# Patient Record
Sex: MALE | Race: WHITE | NOT HISPANIC OR LATINO
[De-identification: names, ages, dates, MRNs, and addresses within clinical notes are randomized per-mention and may not be internally consistent; named-entity substitution may affect disease eponyms.]

---

## 2019-03-04 PROBLEM — Z00.00 ENCOUNTER FOR PREVENTIVE HEALTH EXAMINATION: Status: ACTIVE | Noted: 2019-03-04

## 2019-04-29 ENCOUNTER — APPOINTMENT (OUTPATIENT)
Dept: ORTHOPEDIC SURGERY | Facility: CLINIC | Age: 79
End: 2019-04-29
Payer: MEDICARE

## 2019-04-29 DIAGNOSIS — Z87.891 PERSONAL HISTORY OF NICOTINE DEPENDENCE: ICD-10-CM

## 2019-04-29 DIAGNOSIS — Z85.46 PERSONAL HISTORY OF MALIGNANT NEOPLASM OF PROSTATE: ICD-10-CM

## 2019-04-29 DIAGNOSIS — Z83.3 FAMILY HISTORY OF DIABETES MELLITUS: ICD-10-CM

## 2019-04-29 DIAGNOSIS — Z87.898 PERSONAL HISTORY OF OTHER SPECIFIED CONDITIONS: ICD-10-CM

## 2019-04-29 PROCEDURE — 73564 X-RAY EXAM KNEE 4 OR MORE: CPT | Mod: 50

## 2019-04-29 PROCEDURE — 99213 OFFICE O/P EST LOW 20 MIN: CPT

## 2019-04-29 RX ORDER — ASPIRIN 81 MG
81 TABLET, DELAYED RELEASE (ENTERIC COATED) ORAL
Refills: 0 | Status: ACTIVE | COMMUNITY

## 2019-04-29 RX ORDER — IBUPROFEN 800 MG
800 TABLET ORAL
Refills: 0 | Status: ACTIVE | COMMUNITY

## 2019-04-29 NOTE — REVIEW OF SYSTEMS
[Joint Pain] : joint pain [Joint Stiffness] : joint stiffness [Negative] : Heme/Lymph [Joint Swelling] : no joint swelling [FreeTextEntry9] : Left knee

## 2019-04-29 NOTE — HISTORY OF PRESENT ILLNESS
[de-identified] : 79 year old male s/p right total knee replacement presents to the office today for a follow up. Pt reports doing very well in regard to his right hip. He reports increased functionality and mobility. However, patient reports pain in his arthritic left knee that we have been following him for. Pt was last seen by us in November 2018 and received a cortisone injection with much relief.  Pt reports just having activity related pain. He reports taking Motrin for pain with good relief. Patient is back today looking to receive another injection.

## 2019-04-29 NOTE — PHYSICAL EXAM
[2+] : left 2+ [Normal] : Alert and in no acute distress [de-identified] : Left Knee\par Inspection: no effusion or erythema.\par Wounds: none.\par Alignment: normal.\par Palpation: no specific tenderness on palpation.\par ROM: Full ROM with flexion and extension. \par Muscle Test: good quad strength.\par Leg examination: calf is soft and non-tender.\par \par Right Knee\par Inspection: no effusion\par Wounds: healed midline incision\par Alignment: normal.\par Palpation: no specific tenderness on palpation.\par ROM: Full ROM with flexion and extension. \par Muscle Test: good quad strength.\par Leg examination: calf is soft and non-tender.\par  [de-identified] : Sensation grossly intact about bilateral lower extremities.\par  [de-identified] : Radiographs on April 29, 2019 AP standing both knees lateral and skyline both knees demonstrate the bony structures are intact no fractures or dislocations there is a well aligned cemented posterior stabilized knee replacement on the right advanced medial compartment arthritis on the left with a significantly narrowed but not yet bone-on-bone medial compartment impression stable right knee prosthesis and medial compartment arthritis left knee

## 2019-04-29 NOTE — ASSESSMENT
[FreeTextEntry1] : Patient comes in today status post right total knee replacement in June of 2015 the right knee is doing great no complaints his only complaint is recurrence of the pain in the left knee he did receive a steroid injection in that knee last year we'll which lasted a few months he is also had success with the gel injections and is interested in the doing them again for the left knee clinically the right knee has a well-healed midline incision full extension with flexion beyond 100° with good stability the left knee and has both medial lateral joint line tenderness and pain and crepitus on range of motion radiographs please refer to imaging impression status post successful right total knee replacement Osteoarthritis of the left knee plan gel injections for the left knee

## 2019-07-01 ENCOUNTER — APPOINTMENT (OUTPATIENT)
Dept: ORTHOPEDIC SURGERY | Facility: CLINIC | Age: 79
End: 2019-07-01
Payer: MEDICARE

## 2019-07-01 RX ORDER — HYALURONATE SOD, CROSS-LINKED 30 MG/3 ML
30 SYRINGE (ML) INTRAARTICULAR
Refills: 0 | Status: COMPLETED | OUTPATIENT
Start: 2019-07-01

## 2019-07-01 RX ORDER — MULTIVITAMIN
TABLET ORAL
Refills: 0 | Status: DISCONTINUED | COMMUNITY
End: 2019-07-01

## 2019-07-01 RX ADMIN — Medication 0 MG/3ML: at 00:00

## 2019-07-01 NOTE — PHYSICAL EXAM
[de-identified] : Discussed at length with the patient the planned Gel-One injection. The risks, benefits, convalescence and alternatives were reviewed. The possible side effects discussed included but were not limited to: pain, swelling, heat and redness. There symptoms are generally mild but if they are extensive then contact the office. Giving pain relievers by mouth such as NSAID’s or Tylenol can generally treat the reactions to steroid and lidocaine. Rare cases of infection have been noted. Rash, hives and itching may occur post injection. If you have muscle pain or cramps, flushing and or swelling of the face, rapid heart beat, nausea, dizziness, fever, chills, headache, difficulty breathing, swelling in the arms or legs, or have a prickly feeling of your skin, contact a health care provider immediately.\par \par Following this discussion, the knee was prepped with Betadine and under sterile conditions the Gel-One injection was performed with a 22 gauge needle. The needle was introduced into the joint, aspiration was performed to ensure intra-articular placement and the medication was injected. Upon withdrawal of the needle the site was cleaned with alcohol and a Band-Aid applied. The patient tolerated the injection well and there were no adverse effects. Post injection instructions included no strenuous activity for 24 hours, cryotherapy and if there are any adverse effects to contact the office.

## 2020-01-06 ENCOUNTER — APPOINTMENT (OUTPATIENT)
Dept: ORTHOPEDIC SURGERY | Facility: CLINIC | Age: 80
End: 2020-01-06
Payer: MEDICARE

## 2020-01-06 PROCEDURE — 99214 OFFICE O/P EST MOD 30 MIN: CPT

## 2020-01-06 NOTE — PHYSICAL EXAM
[de-identified] : Left Knee-\par Varus deformity\par medial joint line tenderness \par ROM: 0-100 degrees  [de-identified] : No imaging performed today

## 2020-01-06 NOTE — ASSESSMENT
[FreeTextEntry1] : Pt is here to f/u after gel one injection into the left knee in July which gave him good relief. The pain is starting to return. He has documented end stage arthritis. He is happy at this time , treating it conservatively, we will re order the gel injections for the left knee and start them once we have the product.

## 2020-01-06 NOTE — HISTORY OF PRESENT ILLNESS
[de-identified] : 79 year old male s/p right total knee replacement presents to the office today for a follow up on his arthritic left knee. Pt received a Gel one injection in his left knee in July 2019 with some relief. Pt has been taking Motrin for pain with only some relief. Pt is here today to discuss his options for another gel injection in his arthritic left knee.

## 2020-06-11 ENCOUNTER — APPOINTMENT (OUTPATIENT)
Dept: ORTHOPEDIC SURGERY | Facility: CLINIC | Age: 80
End: 2020-06-11
Payer: MEDICARE

## 2020-06-11 VITALS — TEMPERATURE: 98.1 F

## 2020-06-11 RX ORDER — HYALURONATE SODIUM 20 MG/2 ML
20 SYRINGE (ML) INTRAARTICULAR
Refills: 0 | Status: COMPLETED | OUTPATIENT
Start: 2020-06-11

## 2020-06-11 RX ADMIN — Medication 2 MG/2ML: at 00:00

## 2020-06-11 NOTE — ASSESSMENT
[FreeTextEntry1] : Discussed at length with the patient the planned Euflexxa injection. The risks, benefits, convalescence and alternatives were reviewed. The possible side effects discussed included but were not limited to: pain, swelling, heat and redness. There symptoms are generally mild but if they are extensive then contact the office. Giving pain relievers by mouth such as NSAID’s or Tylenol can generally treat the reactions to steroid and lidocaine. Rare cases of infection have been noted. Rash, hives and itching may occur post injection. If you have muscle pain or cramps, flushing and or swelling of the face, rapid heart beat, nausea, dizziness, fever, chills, headache, difficulty breathing, swelling in the arms or legs, or have a prickly feeling of your skin, contact a health care provider immediately.\par \par Following this discussion, the left knee was prepped with betadine and under sterile conditions the Euflexxa injection was performed with a 22 gauge needle. The needle was introduced into the joint, aspiration was performed to ensure intra-articular placement and the medication was injected. Upon withdrawal of the needle the site was cleaned with alcohol and a bandaid applied. The patient tolerated the injection well and there were no adverse effects. Post injection instructions included no strenuous activity for 24 hours, cryotherapy and if there are any adverse effects to contact the office.

## 2020-06-19 ENCOUNTER — APPOINTMENT (OUTPATIENT)
Dept: ORTHOPEDIC SURGERY | Facility: CLINIC | Age: 80
End: 2020-06-19
Payer: MEDICARE

## 2020-06-19 PROCEDURE — 99212 OFFICE O/P EST SF 10 MIN: CPT | Mod: 25

## 2020-06-19 RX ORDER — HYALURONATE SODIUM 20 MG/2 ML
20 SYRINGE (ML) INTRAARTICULAR
Refills: 0 | Status: COMPLETED | OUTPATIENT
Start: 2020-06-19

## 2020-06-19 RX ADMIN — Medication 2 MG/2ML: at 00:00

## 2020-06-19 NOTE — HISTORY OF PRESENT ILLNESS
[de-identified] : 80 year old male presents to the office for his second Euflexxa injection in his left arthritic knee.

## 2020-06-26 ENCOUNTER — APPOINTMENT (OUTPATIENT)
Dept: ORTHOPEDIC SURGERY | Facility: CLINIC | Age: 80
End: 2020-06-26
Payer: MEDICARE

## 2020-06-26 VITALS — TEMPERATURE: 97.2 F

## 2020-06-26 PROCEDURE — 99213 OFFICE O/P EST LOW 20 MIN: CPT | Mod: 25

## 2020-06-26 RX ORDER — HYALURONATE SODIUM 20 MG/2 ML
20 SYRINGE (ML) INTRAARTICULAR
Refills: 0 | Status: COMPLETED | OUTPATIENT
Start: 2020-06-26

## 2020-06-26 RX ADMIN — Medication 2 MG/2ML: at 00:00

## 2020-06-26 NOTE — HISTORY OF PRESENT ILLNESS
[de-identified] : 80 year old male presents to the office today for a Euflexxa injection in his left arthritic knee.

## 2020-09-02 ENCOUNTER — APPOINTMENT (OUTPATIENT)
Dept: UROLOGY | Facility: CLINIC | Age: 80
End: 2020-09-02
Payer: MEDICARE

## 2020-09-02 VITALS — TEMPERATURE: 98.2 F | WEIGHT: 162 LBS | HEIGHT: 70 IN | BODY MASS INDEX: 23.19 KG/M2

## 2020-09-02 DIAGNOSIS — R31.29 OTHER MICROSCOPIC HEMATURIA: ICD-10-CM

## 2020-09-02 PROCEDURE — 99214 OFFICE O/P EST MOD 30 MIN: CPT

## 2020-09-02 RX ORDER — SIMVASTATIN 40 MG/1
TABLET, FILM COATED ORAL
Refills: 0 | Status: ACTIVE | COMMUNITY

## 2020-09-02 NOTE — ASSESSMENT
[FreeTextEntry1] : 80 year old male with a history of prostate cancer diagnosed in 2002 and had a prostatectomy. Patient said his PSA has been undetectable but no recent PSA.He had an IPP in 2003 and had it replaced again approx 10 years later. He states that he noticed 2 months ago that he had a UTI and had microscopic hematuria. He states since he has had penile pressure and inflammation- on my exam has impending erosion on the left side.  Also the pump was hard and difficult to work. He has not seen Dr. Schmidt in over 3 years and has an appointment at the end of the month. Voiding with a good flow. Denies gross hematuria, dysuria, flank pain, fever, or chills. Non smoker. \par \par

## 2020-09-02 NOTE — HISTORY OF PRESENT ILLNESS
[Currently Experiencing ___] :  [unfilled] [Post-Void Dribbling] : post-void dribbling [Erectile Dysfunction] : Erectile Dysfunction [None] : None [FreeTextEntry1] : 80 year old male with a history of prostate cancer diagnosed in 2002 and had a prostatectomy. Patient said his PSA has been undetectable but no recent PSA.He had an IPP in 2003 and had it replaced again approx 10 years later. He states that he noticed 2 months ago that he had a UTI and had microscopic hematuria. He states since he has had penile pressure and inflammation- on my exam has impending erosion on the left side.  Also the pump was hard and difficult to work. He has not seen Dr. Schmidt in over 3 years and has an appointment at the end of the month. Voiding with a good flow. Denies gross hematuria, dysuria, flank pain, fever, or chills. Non smoker. \par \par  [Urinary Incontinence] : no urinary incontinence [Urinary Urgency] : no urinary urgency [Urinary Retention] : no urinary retention [Urinary Frequency] : no urinary frequency [Straining] : no straining [Nocturia] : no nocturia [Hematuria - Gross] : no gross hematuria [Weak Stream] : no weak stream [Intermittency] : no intermittency

## 2020-09-02 NOTE — PHYSICAL EXAM
[Normal Appearance] : normal appearance [General Appearance - Well Nourished] : well nourished [General Appearance - Well Developed] : well developed [Edema] : no peripheral edema [Well Groomed] : well groomed [General Appearance - In No Acute Distress] : no acute distress [Respiration, Rhythm And Depth] : normal respiratory rhythm and effort [Abdomen Soft] : soft [Exaggerated Use Of Accessory Muscles For Inspiration] : no accessory muscle use [Abdomen Tenderness] : non-tender [Costovertebral Angle Tenderness] : no ~M costovertebral angle tenderness [Normal Station and Gait] : the gait and station were normal for the patient's age [No Focal Deficits] : no focal deficits [] : no rash [Oriented To Time, Place, And Person] : oriented to person, place, and time [Affect] : the affect was normal [Mood] : the mood was normal [Not Anxious] : not anxious [FreeTextEntry1] : impending erosion left dital tip of IPP, pump is difficult to pump

## 2020-09-30 ENCOUNTER — RESULT REVIEW (OUTPATIENT)
Age: 80
End: 2020-09-30

## 2020-09-30 ENCOUNTER — OUTPATIENT (OUTPATIENT)
Dept: OUTPATIENT SERVICES | Facility: HOSPITAL | Age: 80
LOS: 1 days | Discharge: HOME | End: 2020-09-30
Payer: MEDICARE

## 2020-09-30 VITALS
TEMPERATURE: 98 F | SYSTOLIC BLOOD PRESSURE: 145 MMHG | DIASTOLIC BLOOD PRESSURE: 67 MMHG | WEIGHT: 165.57 LBS | HEIGHT: 70 IN | OXYGEN SATURATION: 97 % | HEART RATE: 78 BPM | RESPIRATION RATE: 16 BRPM

## 2020-09-30 DIAGNOSIS — Z98.890 OTHER SPECIFIED POSTPROCEDURAL STATES: Chronic | ICD-10-CM

## 2020-09-30 DIAGNOSIS — S68.119A COMPLETE TRAUMATIC METACARPOPHALANGEAL AMPUTATION OF UNSPECIFIED FINGER, INITIAL ENCOUNTER: Chronic | ICD-10-CM

## 2020-09-30 DIAGNOSIS — Z96.0 PRESENCE OF UROGENITAL IMPLANTS: Chronic | ICD-10-CM

## 2020-09-30 DIAGNOSIS — N52.31 ERECTILE DYSFUNCTION FOLLOWING RADICAL PROSTATECTOMY: ICD-10-CM

## 2020-09-30 DIAGNOSIS — Z01.818 ENCOUNTER FOR OTHER PREPROCEDURAL EXAMINATION: ICD-10-CM

## 2020-09-30 DIAGNOSIS — Z90.49 ACQUIRED ABSENCE OF OTHER SPECIFIED PARTS OF DIGESTIVE TRACT: Chronic | ICD-10-CM

## 2020-09-30 DIAGNOSIS — Z90.79 ACQUIRED ABSENCE OF OTHER GENITAL ORGAN(S): Chronic | ICD-10-CM

## 2020-09-30 DIAGNOSIS — Z98.49 CATARACT EXTRACTION STATUS, UNSPECIFIED EYE: Chronic | ICD-10-CM

## 2020-09-30 DIAGNOSIS — Z96.651 PRESENCE OF RIGHT ARTIFICIAL KNEE JOINT: Chronic | ICD-10-CM

## 2020-09-30 LAB
ALBUMIN SERPL ELPH-MCNC: 4.4 G/DL — SIGNIFICANT CHANGE UP (ref 3.5–5.2)
ALP SERPL-CCNC: 95 U/L — SIGNIFICANT CHANGE UP (ref 30–115)
ALT FLD-CCNC: 16 U/L — SIGNIFICANT CHANGE UP (ref 0–41)
ANION GAP SERPL CALC-SCNC: 11 MMOL/L — SIGNIFICANT CHANGE UP (ref 7–14)
APPEARANCE UR: CLEAR — SIGNIFICANT CHANGE UP
APTT BLD: 34 SEC — SIGNIFICANT CHANGE UP (ref 27–39.2)
AST SERPL-CCNC: 19 U/L — SIGNIFICANT CHANGE UP (ref 0–41)
BASOPHILS # BLD AUTO: 0.06 K/UL — SIGNIFICANT CHANGE UP (ref 0–0.2)
BASOPHILS NFR BLD AUTO: 0.9 % — SIGNIFICANT CHANGE UP (ref 0–1)
BILIRUB SERPL-MCNC: 0.7 MG/DL — SIGNIFICANT CHANGE UP (ref 0.2–1.2)
BILIRUB UR-MCNC: NEGATIVE — SIGNIFICANT CHANGE UP
BUN SERPL-MCNC: 17 MG/DL — SIGNIFICANT CHANGE UP (ref 10–20)
CALCIUM SERPL-MCNC: 9.5 MG/DL — SIGNIFICANT CHANGE UP (ref 8.5–10.1)
CHLORIDE SERPL-SCNC: 99 MMOL/L — SIGNIFICANT CHANGE UP (ref 98–110)
CO2 SERPL-SCNC: 23 MMOL/L — SIGNIFICANT CHANGE UP (ref 17–32)
COLOR SPEC: SIGNIFICANT CHANGE UP
CREAT SERPL-MCNC: 1.1 MG/DL — SIGNIFICANT CHANGE UP (ref 0.7–1.5)
DIFF PNL FLD: NEGATIVE — SIGNIFICANT CHANGE UP
EOSINOPHIL # BLD AUTO: 0.31 K/UL — SIGNIFICANT CHANGE UP (ref 0–0.7)
EOSINOPHIL NFR BLD AUTO: 4.5 % — SIGNIFICANT CHANGE UP (ref 0–8)
GLUCOSE SERPL-MCNC: 95 MG/DL — SIGNIFICANT CHANGE UP (ref 70–99)
GLUCOSE UR QL: NEGATIVE — SIGNIFICANT CHANGE UP
HCT VFR BLD CALC: 46.9 % — SIGNIFICANT CHANGE UP (ref 42–52)
HGB BLD-MCNC: 16.2 G/DL — SIGNIFICANT CHANGE UP (ref 14–18)
IMM GRANULOCYTES NFR BLD AUTO: 0.4 % — HIGH (ref 0.1–0.3)
INR BLD: 1.03 RATIO — SIGNIFICANT CHANGE UP (ref 0.65–1.3)
KETONES UR-MCNC: NEGATIVE — SIGNIFICANT CHANGE UP
LEUKOCYTE ESTERASE UR-ACNC: NEGATIVE — SIGNIFICANT CHANGE UP
LYMPHOCYTES # BLD AUTO: 1.5 K/UL — SIGNIFICANT CHANGE UP (ref 1.2–3.4)
LYMPHOCYTES # BLD AUTO: 21.9 % — SIGNIFICANT CHANGE UP (ref 20.5–51.1)
MCHC RBC-ENTMCNC: 31.5 PG — HIGH (ref 27–31)
MCHC RBC-ENTMCNC: 34.5 G/DL — SIGNIFICANT CHANGE UP (ref 32–37)
MCV RBC AUTO: 91.1 FL — SIGNIFICANT CHANGE UP (ref 80–94)
MONOCYTES # BLD AUTO: 0.72 K/UL — HIGH (ref 0.1–0.6)
MONOCYTES NFR BLD AUTO: 10.5 % — HIGH (ref 1.7–9.3)
NEUTROPHILS # BLD AUTO: 4.23 K/UL — SIGNIFICANT CHANGE UP (ref 1.4–6.5)
NEUTROPHILS NFR BLD AUTO: 61.8 % — SIGNIFICANT CHANGE UP (ref 42.2–75.2)
NITRITE UR-MCNC: NEGATIVE — SIGNIFICANT CHANGE UP
NRBC # BLD: 0 /100 WBCS — SIGNIFICANT CHANGE UP (ref 0–0)
PH UR: 6 — SIGNIFICANT CHANGE UP (ref 5–8)
PLATELET # BLD AUTO: 216 K/UL — SIGNIFICANT CHANGE UP (ref 130–400)
POTASSIUM SERPL-MCNC: 4.5 MMOL/L — SIGNIFICANT CHANGE UP (ref 3.5–5)
POTASSIUM SERPL-SCNC: 4.5 MMOL/L — SIGNIFICANT CHANGE UP (ref 3.5–5)
PROT SERPL-MCNC: 7.3 G/DL — SIGNIFICANT CHANGE UP (ref 6–8)
PROT UR-MCNC: SIGNIFICANT CHANGE UP
PROTHROM AB SERPL-ACNC: 11.9 SEC — SIGNIFICANT CHANGE UP (ref 9.95–12.87)
RBC # BLD: 5.15 M/UL — SIGNIFICANT CHANGE UP (ref 4.7–6.1)
RBC # FLD: 11.9 % — SIGNIFICANT CHANGE UP (ref 11.5–14.5)
SODIUM SERPL-SCNC: 133 MMOL/L — LOW (ref 135–146)
SP GR SPEC: 1.02 — SIGNIFICANT CHANGE UP (ref 1.01–1.03)
UROBILINOGEN FLD QL: SIGNIFICANT CHANGE UP
WBC # BLD: 6.85 K/UL — SIGNIFICANT CHANGE UP (ref 4.8–10.8)
WBC # FLD AUTO: 6.85 K/UL — SIGNIFICANT CHANGE UP (ref 4.8–10.8)

## 2020-09-30 PROCEDURE — 93010 ELECTROCARDIOGRAM REPORT: CPT

## 2020-09-30 PROCEDURE — 71046 X-RAY EXAM CHEST 2 VIEWS: CPT | Mod: 26

## 2020-09-30 NOTE — H&P PST ADULT - REASON FOR ADMISSION
PT PRESENTS TO PAST WITH NO SOB, CP, PALPITATIONS, DYSURIA, UTI OR URI AT PRESENT.   PT ABLE TO WALK UP 2-3 FLIGHTS OF STEPS WITH NO SOB.  AS PER THE PT, THIS IS HIS/HER COMPLETE MEDICAL AND SURGICAL HX, INCLUDING MEDICATIONS PRESCRIBED AND OVER THE COUNTER  pt denies any covid s/s, or tested positive in the past  pt advised self quarantine till day of procedure  denies travel outside the [place] in the past 30 days  Anesthesia Alert  NO--Difficult Airway  NO--History of neck surgery or radiation  NO--Limited ROM of neck  NO--History of Malignant hyperthermia  NO--Personal or family history of Pseudocholinesterase deficiency  YES --Prior Anesthesia Complication-DELAYED AWAKENING   NO--Latex Allergy  NO--Loose teeth  NO--History of Rheumatoid Arthritis  NO--DARIN  NO--Other_____  80 YR MALE- PT SCHEDULED FOR REMOVAL AND REPLACEMENT OF INSERTION OF PENILE PROSTHESIS. no

## 2020-09-30 NOTE — H&P PST ADULT - NSICDXPASTMEDICALHX_GEN_ALL_CORE_FT
PAST MEDICAL HISTORY:  Cancer of prostate     COPD (chronic obstructive pulmonary disease)     History of seizure LAST SEIZURE 3.5 YRS     PAST MEDICAL HISTORY:  Cancer of prostate     COPD (chronic obstructive pulmonary disease)     History of seizure LAST SEIZURE 3.5 YRS    [place] (hard of hearing)     Hypercholesterolemia

## 2020-10-01 ENCOUNTER — OUTPATIENT (OUTPATIENT)
Dept: OUTPATIENT SERVICES | Facility: HOSPITAL | Age: 80
LOS: 1 days | Discharge: HOME | End: 2020-10-01
Payer: MEDICARE

## 2020-10-01 ENCOUNTER — RESULT REVIEW (OUTPATIENT)
Age: 80
End: 2020-10-01

## 2020-10-01 DIAGNOSIS — S68.119A COMPLETE TRAUMATIC METACARPOPHALANGEAL AMPUTATION OF UNSPECIFIED FINGER, INITIAL ENCOUNTER: Chronic | ICD-10-CM

## 2020-10-01 DIAGNOSIS — Z98.49 CATARACT EXTRACTION STATUS, UNSPECIFIED EYE: Chronic | ICD-10-CM

## 2020-10-01 DIAGNOSIS — Z90.49 ACQUIRED ABSENCE OF OTHER SPECIFIED PARTS OF DIGESTIVE TRACT: Chronic | ICD-10-CM

## 2020-10-01 DIAGNOSIS — Z98.890 OTHER SPECIFIED POSTPROCEDURAL STATES: Chronic | ICD-10-CM

## 2020-10-01 DIAGNOSIS — Z96.651 PRESENCE OF RIGHT ARTIFICIAL KNEE JOINT: Chronic | ICD-10-CM

## 2020-10-01 DIAGNOSIS — Z90.79 ACQUIRED ABSENCE OF OTHER GENITAL ORGAN(S): Chronic | ICD-10-CM

## 2020-10-01 DIAGNOSIS — Z96.0 PRESENCE OF UROGENITAL IMPLANTS: Chronic | ICD-10-CM

## 2020-10-01 LAB
CULTURE RESULTS: SIGNIFICANT CHANGE UP
SPECIMEN SOURCE: SIGNIFICANT CHANGE UP

## 2020-10-01 PROCEDURE — 71046 X-RAY EXAM CHEST 2 VIEWS: CPT | Mod: 26

## 2020-10-01 PROCEDURE — 71046 X-RAY EXAM CHEST 2 VIEWS: CPT | Mod: 26,76

## 2020-10-05 ENCOUNTER — APPOINTMENT (OUTPATIENT)
Dept: UROLOGY | Facility: CLINIC | Age: 80
End: 2020-10-05
Payer: MEDICARE

## 2020-10-05 ENCOUNTER — EMERGENCY (EMERGENCY)
Facility: HOSPITAL | Age: 80
LOS: 1 days | Discharge: HOME | End: 2020-10-05
Admitting: UROLOGY

## 2020-10-05 ENCOUNTER — INPATIENT (INPATIENT)
Facility: HOSPITAL | Age: 80
LOS: 0 days | Discharge: HOME | End: 2020-10-06
Attending: UROLOGY | Admitting: UROLOGY
Payer: MEDICARE

## 2020-10-05 VITALS
TEMPERATURE: 97 F | OXYGEN SATURATION: 97 % | SYSTOLIC BLOOD PRESSURE: 162 MMHG | RESPIRATION RATE: 20 BRPM | DIASTOLIC BLOOD PRESSURE: 86 MMHG | HEART RATE: 105 BPM

## 2020-10-05 DIAGNOSIS — S68.119A COMPLETE TRAUMATIC METACARPOPHALANGEAL AMPUTATION OF UNSPECIFIED FINGER, INITIAL ENCOUNTER: Chronic | ICD-10-CM

## 2020-10-05 DIAGNOSIS — Z98.890 OTHER SPECIFIED POSTPROCEDURAL STATES: Chronic | ICD-10-CM
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
## 2020-10-05 DIAGNOSIS — Z90.49 ACQUIRED ABSENCE OF OTHER SPECIFIED PARTS OF DIGESTIVE TRACT: Chronic | ICD-10-CM

## 2020-10-05 DIAGNOSIS — Z90.79 ACQUIRED ABSENCE OF OTHER GENITAL ORGAN(S): Chronic | ICD-10-CM

## 2020-10-05 DIAGNOSIS — T83.410A BREAKDOWN (MECHANICAL) OF IMPLANTED PENILE PROSTHESIS, INITIAL ENCOUNTER: ICD-10-CM

## 2020-10-05 DIAGNOSIS — Z98.49 CATARACT EXTRACTION STATUS, UNSPECIFIED EYE: Chronic | ICD-10-CM

## 2020-10-05 DIAGNOSIS — Z96.651 PRESENCE OF RIGHT ARTIFICIAL KNEE JOINT: Chronic | ICD-10-CM

## 2020-10-05 DIAGNOSIS — Z96.0 PRESENCE OF UROGENITAL IMPLANTS: ICD-10-CM

## 2020-10-05 DIAGNOSIS — Z96.0 PRESENCE OF UROGENITAL IMPLANTS: Chronic | ICD-10-CM
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
## 2020-10-05 DIAGNOSIS — N48.89 OTHER SPECIFIED DISORDERS OF PENIS: ICD-10-CM

## 2020-10-05 DIAGNOSIS — J44.9 CHRONIC OBSTRUCTIVE PULMONARY DISEASE, UNSPECIFIED: ICD-10-CM

## 2020-10-05 PROBLEM — Z87.898 PERSONAL HISTORY OF OTHER SPECIFIED CONDITIONS: Chronic | Status: ACTIVE | Noted: 2020-09-30

## 2020-10-05 PROBLEM — H91.90 UNSPECIFIED HEARING LOSS, UNSPECIFIED EAR: Chronic | Status: ACTIVE | Noted: 2020-09-30

## 2020-10-05 PROBLEM — E78.00 PURE HYPERCHOLESTEROLEMIA, UNSPECIFIED: Chronic | Status: ACTIVE | Noted: 2020-09-30

## 2020-10-05 PROBLEM — C61 MALIGNANT NEOPLASM OF PROSTATE: Chronic | Status: ACTIVE | Noted: 2020-09-30

## 2020-10-05 LAB
ALBUMIN SERPL ELPH-MCNC: 4.3 G/DL — SIGNIFICANT CHANGE UP (ref 3.5–5.2)
ALP SERPL-CCNC: 88 U/L — SIGNIFICANT CHANGE UP (ref 30–115)
ALT FLD-CCNC: 14 U/L — SIGNIFICANT CHANGE UP (ref 0–41)
ANION GAP SERPL CALC-SCNC: 8 MMOL/L — SIGNIFICANT CHANGE UP (ref 7–14)
AST SERPL-CCNC: 15 U/L — SIGNIFICANT CHANGE UP (ref 0–41)
B PERT DNA SPEC QL NAA+PROBE: SIGNIFICANT CHANGE UP
BASOPHILS # BLD AUTO: 0.05 K/UL — SIGNIFICANT CHANGE UP (ref 0–0.2)
BASOPHILS NFR BLD AUTO: 0.7 % — SIGNIFICANT CHANGE UP (ref 0–1)
BILIRUB SERPL-MCNC: 0.5 MG/DL — SIGNIFICANT CHANGE UP (ref 0.2–1.2)
BLD GP AB SCN SERPL QL: SIGNIFICANT CHANGE UP
BUN SERPL-MCNC: 15 MG/DL — SIGNIFICANT CHANGE UP (ref 10–20)
C PNEUM DNA SPEC QL NAA+PROBE: SIGNIFICANT CHANGE UP
CALCIUM SERPL-MCNC: 10.1 MG/DL — SIGNIFICANT CHANGE UP (ref 8.5–10.1)
CHLORIDE SERPL-SCNC: 103 MMOL/L — SIGNIFICANT CHANGE UP (ref 98–110)
CO2 SERPL-SCNC: 26 MMOL/L — SIGNIFICANT CHANGE UP (ref 17–32)
CREAT SERPL-MCNC: 1 MG/DL — SIGNIFICANT CHANGE UP (ref 0.7–1.5)
EOSINOPHIL # BLD AUTO: 0.15 K/UL — SIGNIFICANT CHANGE UP (ref 0–0.7)
EOSINOPHIL NFR BLD AUTO: 2.2 % — SIGNIFICANT CHANGE UP (ref 0–8)
FLUAV H1 2009 PAND RNA SPEC QL NAA+PROBE: SIGNIFICANT CHANGE UP
FLUAV H1 RNA SPEC QL NAA+PROBE: SIGNIFICANT CHANGE UP
FLUAV H3 RNA SPEC QL NAA+PROBE: SIGNIFICANT CHANGE UP
FLUAV SUBTYP SPEC NAA+PROBE: SIGNIFICANT CHANGE UP
FLUBV RNA SPEC QL NAA+PROBE: SIGNIFICANT CHANGE UP
GLUCOSE SERPL-MCNC: 112 MG/DL — HIGH (ref 70–99)
HADV DNA SPEC QL NAA+PROBE: SIGNIFICANT CHANGE UP
HCOV PNL SPEC NAA+PROBE: SIGNIFICANT CHANGE UP
HCT VFR BLD CALC: 46.2 % — SIGNIFICANT CHANGE UP (ref 42–52)
HGB BLD-MCNC: 15.8 G/DL — SIGNIFICANT CHANGE UP (ref 14–18)
HMPV RNA SPEC QL NAA+PROBE: SIGNIFICANT CHANGE UP
HPIV1 RNA SPEC QL NAA+PROBE: SIGNIFICANT CHANGE UP
HPIV2 RNA SPEC QL NAA+PROBE: SIGNIFICANT CHANGE UP
HPIV3 RNA SPEC QL NAA+PROBE: SIGNIFICANT CHANGE UP
HPIV4 RNA SPEC QL NAA+PROBE: SIGNIFICANT CHANGE UP
IMM GRANULOCYTES NFR BLD AUTO: 0.3 % — SIGNIFICANT CHANGE UP (ref 0.1–0.3)
INR BLD: 1.11 RATIO — SIGNIFICANT CHANGE UP (ref 0.65–1.3)
LYMPHOCYTES # BLD AUTO: 1.32 K/UL — SIGNIFICANT CHANGE UP (ref 1.2–3.4)
LYMPHOCYTES # BLD AUTO: 19 % — LOW (ref 20.5–51.1)
MCHC RBC-ENTMCNC: 31.2 PG — HIGH (ref 27–31)
MCHC RBC-ENTMCNC: 34.2 G/DL — SIGNIFICANT CHANGE UP (ref 32–37)
MCV RBC AUTO: 91.3 FL — SIGNIFICANT CHANGE UP (ref 80–94)
MONOCYTES # BLD AUTO: 0.72 K/UL — HIGH (ref 0.1–0.6)
MONOCYTES NFR BLD AUTO: 10.4 % — HIGH (ref 1.7–9.3)
NEUTROPHILS # BLD AUTO: 4.69 K/UL — SIGNIFICANT CHANGE UP (ref 1.4–6.5)
NEUTROPHILS NFR BLD AUTO: 67.4 % — SIGNIFICANT CHANGE UP (ref 42.2–75.2)
NRBC # BLD: 0 /100 WBCS — SIGNIFICANT CHANGE UP (ref 0–0)
PLATELET # BLD AUTO: 229 K/UL — SIGNIFICANT CHANGE UP (ref 130–400)
POTASSIUM SERPL-MCNC: 4.6 MMOL/L — SIGNIFICANT CHANGE UP (ref 3.5–5)
POTASSIUM SERPL-SCNC: 4.6 MMOL/L — SIGNIFICANT CHANGE UP (ref 3.5–5)
PROT SERPL-MCNC: 7 G/DL — SIGNIFICANT CHANGE UP (ref 6–8)
PROTHROM AB SERPL-ACNC: 12.8 SEC — SIGNIFICANT CHANGE UP (ref 9.95–12.87)
RAPID RVP RESULT: SIGNIFICANT CHANGE UP
RBC # BLD: 5.06 M/UL — SIGNIFICANT CHANGE UP (ref 4.7–6.1)
RBC # FLD: 12.2 % — SIGNIFICANT CHANGE UP (ref 11.5–14.5)
RSV RNA SPEC QL NAA+PROBE: SIGNIFICANT CHANGE UP
RV+EV RNA SPEC QL NAA+PROBE: SIGNIFICANT CHANGE UP
SARS-COV-2 RNA SPEC QL NAA+PROBE: SIGNIFICANT CHANGE UP
SODIUM SERPL-SCNC: 137 MMOL/L — SIGNIFICANT CHANGE UP (ref 135–146)
WBC # BLD: 6.95 K/UL — SIGNIFICANT CHANGE UP (ref 4.8–10.8)
WBC # FLD AUTO: 6.95 K/UL — SIGNIFICANT CHANGE UP (ref 4.8–10.8)

## 2020-10-05 PROCEDURE — 99214 OFFICE O/P EST MOD 30 MIN: CPT | Mod: 24

## 2020-10-05 PROCEDURE — 99285 EMERGENCY DEPT VISIT HI MDM: CPT | Mod: CS

## 2020-10-05 RX ORDER — SIMVASTATIN 20 MG/1
1 TABLET, FILM COATED ORAL
Qty: 0 | Refills: 0 | DISCHARGE

## 2020-10-05 RX ORDER — ASPIRIN/CALCIUM CARB/MAGNESIUM 324 MG
1 TABLET ORAL
Qty: 0 | Refills: 0 | DISCHARGE

## 2020-10-05 RX ORDER — CEFTRIAXONE 500 MG/1
1000 INJECTION, POWDER, FOR SOLUTION INTRAMUSCULAR; INTRAVENOUS ONCE
Refills: 0 | Status: COMPLETED | OUTPATIENT
Start: 2020-10-05 | End: 2020-10-05

## 2020-10-05 RX ORDER — LEVETIRACETAM 250 MG/1
150 TABLET, FILM COATED ORAL
Qty: 0 | Refills: 0 | DISCHARGE

## 2020-10-05 RX ORDER — CHLORHEXIDINE GLUCONATE 213 G/1000ML
1 SOLUTION TOPICAL
Refills: 0 | Status: DISCONTINUED | OUTPATIENT
Start: 2020-10-05 | End: 2020-10-06

## 2020-10-05 RX ORDER — VITAMIN E 100 UNIT
1 CAPSULE ORAL
Qty: 0 | Refills: 0 | DISCHARGE

## 2020-10-05 RX ORDER — ACETAMINOPHEN 500 MG
650 TABLET ORAL ONCE
Refills: 0 | Status: COMPLETED | OUTPATIENT
Start: 2020-10-05 | End: 2020-10-06

## 2020-10-05 RX ORDER — HYDROXYZINE HCL 10 MG
25 TABLET ORAL ONCE
Refills: 0 | Status: COMPLETED | OUTPATIENT
Start: 2020-10-05 | End: 2020-10-06

## 2020-10-05 RX ORDER — OXCARBAZEPINE 300 MG/1
1 TABLET, FILM COATED ORAL
Qty: 0 | Refills: 0 | DISCHARGE

## 2020-10-05 RX ORDER — SIMVASTATIN 20 MG/1
0 TABLET, FILM COATED ORAL
Qty: 0 | Refills: 0 | DISCHARGE

## 2020-10-05 RX ORDER — LEVETIRACETAM 250 MG/1
2 TABLET, FILM COATED ORAL
Qty: 0 | Refills: 0 | DISCHARGE

## 2020-10-05 RX ORDER — MECLIZINE HCL 12.5 MG
0 TABLET ORAL
Qty: 0 | Refills: 0 | DISCHARGE

## 2020-10-05 RX ORDER — SODIUM CHLORIDE 9 MG/ML
1000 INJECTION INTRAMUSCULAR; INTRAVENOUS; SUBCUTANEOUS
Refills: 0 | Status: DISCONTINUED | OUTPATIENT
Start: 2020-10-05 | End: 2020-10-06

## 2020-10-05 RX ORDER — CEFTRIAXONE 500 MG/1
1000 INJECTION, POWDER, FOR SOLUTION INTRAMUSCULAR; INTRAVENOUS EVERY 24 HOURS
Refills: 0 | Status: DISCONTINUED | OUTPATIENT
Start: 2020-10-06 | End: 2020-10-06

## 2020-10-05 RX ORDER — SIMVASTATIN 20 MG/1
5 TABLET, FILM COATED ORAL DAILY
Refills: 0 | Status: DISCONTINUED | OUTPATIENT
Start: 2020-10-06 | End: 2020-10-06

## 2020-10-05 RX ORDER — LEVETIRACETAM 250 MG/1
1500 TABLET, FILM COATED ORAL
Refills: 0 | Status: DISCONTINUED | OUTPATIENT
Start: 2020-10-05 | End: 2020-10-06

## 2020-10-05 RX ADMIN — LEVETIRACETAM 1500 MILLIGRAM(S): 250 TABLET, FILM COATED ORAL at 18:21

## 2020-10-05 RX ADMIN — SODIUM CHLORIDE 100 MILLILITER(S): 9 INJECTION INTRAMUSCULAR; INTRAVENOUS; SUBCUTANEOUS at 17:30

## 2020-10-05 RX ADMIN — CHLORHEXIDINE GLUCONATE 1 APPLICATION(S): 213 SOLUTION TOPICAL at 18:12

## 2020-10-05 RX ADMIN — CEFTRIAXONE 100 MILLIGRAM(S): 500 INJECTION, POWDER, FOR SOLUTION INTRAMUSCULAR; INTRAVENOUS at 17:00

## 2020-10-05 NOTE — H&P ADULT - ATTENDING COMMENTS
pt seen and examined at the bedside  given impending erosion of penile implant - plan for urgent surgery today -- removal and replacement of implant with better positioning to prevent erosion on the left side

## 2020-10-05 NOTE — ED ADULT TRIAGE NOTE - CHIEF COMPLAINT QUOTE
sent in by Dr. Renner for admission for penile implant complication and possible admission for surgery

## 2020-10-05 NOTE — H&P ADULT - NSICDXPASTMEDICALHX_GEN_ALL_CORE_FT
PAST MEDICAL HISTORY:  Cancer of prostate     COPD (chronic obstructive pulmonary disease)     History of seizure LAST SEIZURE 3.5 YRS    [place] (hard of hearing)     Hypercholesterolemia

## 2020-10-05 NOTE — ED ADULT NURSE NOTE - PSH
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

## 2020-10-05 NOTE — HISTORY OF PRESENT ILLNESS
[FreeTextEntry1] : 80 year old male with a history of prostate cancer diagnosed in 2002 and had a prostatectomy. Patient said his PSA has been undetectable but no recent PSA.He had an IPP in 2003 and had it replaced again approx 10 years later. He states that he noticed 2 months ago that he had a UTI and had microscopic hematuria. He states since he has had penile pressure and inflammation- on my exam has impending erosion on the left side. Also the pump was hard and difficult to work, and has noted a deformity at the right proximal corpora that might be a bend at the midshaft of the coloplast implant. \par  Voiding with a good flow. Denies gross hematuria, dysuria, flank pain, fever, or chills. Non smoker. \par \par pt called me this morning stating that had increased pain and redness at the distal urethra -- on my exam it is apparent that the tip of the implant is nearly eroded \par  \par

## 2020-10-05 NOTE — H&P ADULT - NSICDXPASTSURGICALHX_GEN_ALL_CORE_FT
PAST SURGICAL HISTORY:  Amputation of ring finger TIP OF FINGER    H/O cataract extraction LEFT EYE    H/O hernia repair     H/O prostatectomy     H/O total knee replacement, right     History of appendectomy     History of arthroscopy RT KNEE RT SHOULDER    History of penile implant 2003

## 2020-10-05 NOTE — ED PROVIDER NOTE - CLINICAL SUMMARY MEDICAL DECISION MAKING FREE TEXT BOX
Patient presented for admission to urology for revision of malfunctioning penile implant. Otherwise afebrile, HD stable, no active complaints. Obtained labs which were grossly unremarkable including no significant leukocytosis, anemia, signs of dehydration/DARIEN, transaminitis or significant electrolyte abnormalities. Consulted urology who requested admission to their service. Patient agreeable with plan and will go to OR today. HD stable at time of admission.

## 2020-10-05 NOTE — ED PROVIDER NOTE - OBJECTIVE STATEMENT
this is a 79 yo male presenting to ed for evaluation of penile pain . patient states that he spoke to his surgeon dr hernandes . he wanted patient to come to ed for admission. patient states he is going to operating room room [facility] for revision of his penile implant.

## 2020-10-05 NOTE — PHYSICAL EXAM
[General Appearance - Well Developed] : well developed [General Appearance - Well Nourished] : well nourished [Normal Appearance] : normal appearance [Abdomen Soft] : soft [Abdomen Tenderness] : non-tender [FreeTextEntry1] : impending erosion left dital tip of IPP-- worse than last visit , pump is difficult to pump, defect at the midshaft [Skin Color & Pigmentation] : normal skin color and pigmentation [] : no respiratory distress [Exaggerated Use Of Accessory Muscles For Inspiration] : no accessory muscle use [Oriented To Time, Place, And Person] : oriented to person, place, and time [Not Anxious] : not anxious [Normal Station and Gait] : the gait and station were normal for the patient's age [Femoral Lymph Nodes Enlarged Bilaterally] : femoral [Inguinal Lymph Nodes Enlarged Bilaterally] : inguinal

## 2020-10-05 NOTE — H&P ADULT - NSHPPHYSICALEXAM_GEN_ALL_CORE
lungs: cta  cvs: s1s2  abd: soft, NT/ND  : lungs: cta  cvs: s1s2  abd: soft, NT/ND  : impending erosion left dital tip of IPP, pump is difficult to pump. No erythema, edema/tenderness noted lungs: cta  cvs: s1s2  abd: soft, NT/ND  : right sided buckling noted at base of penis, prosthesis is palpable at glans B/L, tubing visible within scrotum/along shft; impending erosion left distal tip of IPP, pump is difficult to pump. No erythema, edema/tenderness noted

## 2020-10-05 NOTE — ED PROVIDER NOTE - NS ED MD EM SELECTION
Occupational Therapy Evaluation    ASSESSMENT:   Patient presents below baseline which was total assist with bathing/dressing and assist prn with grooming, self feeding. Per POA/son King Asa - pt was using a 2ww at [facility] with general supervision while in hallway to dining area and mod I in room. Pt incontinent at baseline, hx of late stage dementia, COPD, HTN and thoracic fx with cervical collar in place for comfort (per Neurosx, collar for 3 months from 10/31/18 with ability to remove for comfort) and low vision due to macular degeneration. For safe return to prior living situation the patient needs to be maximal assist  for ADLs and supervision  for functional transfers. The patient now presents with impairments in activity tolerance, balance, cognitive changes, limited knowledge of compensatory strategies, safety awareness and strength which impact safe and effective performance ingrooming and functional transfers/mobility . Patient is currently functioning at total assist for ADLs, total assist for functional transfers and total assist for toilet transfers. Pt requires mod assist x1 and min assist of 2nd person with all pivot transfers. Pt A&O x1 presenting with disorganized thinking, inability to process 2 step commands, requires increased time with all activity.     Further skilled occupational therapy services are medically necessary to address the above impairments and performance deficits to maximize the patient's independence    OT Identified Barriers to Discharge: weakness, poor safety awareness, cognitive deficits, assist for all ADLs      PLAN AND RECOMMENDATIONS:   Objective Measures Impacting Discharge Recommendation:   No formal objective measures completed this session    Recommendations for Discharge:  Recommendations for Discharge: OT: Assisted living, 24 Hour assist, Less intensive rehab(recommend SA vs NH with assist for all tranfers)      Plan:   Continue skilled OT, including the following Treatment Interventions: ADL retraining;Functional transfer training;UE strengthening/ROM; Endurance training;Cognitive reorientation;Equipment eval/education; Compensatory technique education (11/20/18 1114)   , Frequency Comments: MWF (SA vs NH with assist for transfers) AM (11/20/18 1114)     Treatment Plan for Next Session: bring aide to walk with 2ww to sink and set up procedural grooming task; 2nd person/chair follow to walk around bed sink > chair                 DIAGNOSIS:   1. Urinary tract infection without hematuria, site unspecified    2. Fall, initial encounter           PRECAUTIONS:   Precautions  Neck Brace: Yes, For comfort(issued 10/31/18 by Dr. Wenceslao Chatman neurosx )  Cervical Precautions: Yes  Other Precautions: hx of late stage dementia, COPD, HTN, thoracic spine fx with cervical collar issued 10/31/18 from Dr. Wenceslao Chatman (neurosx)        EDUCATION:   On this date, the patient and family member was educated on diagnosis considerations pertaining to rehab, self-cares and fall prevention. The response to education was: Needs reinforcement. Discussed with Patient the need for adequate help at home upon discharge. Patient currently lacks sufficient help at their previous living situation. Please see above for discharge recommendations. Family/caregiver teaching is not appropriate based on discharge plan. SUBJECTIVE:   Subjective: pt cooperative during eval (11/20/18 1114)       OBJECTIVE:   Self Cares/ADLs:   Self Cares/ADL's  Grooming Assistance: Total Assist - Non-dependent (11/20/18 1114)  Grooming/Oral Hygiene Deficit: Brushing hair (11/20/18 1114)  Lower Body Clothing Assistance: Total Assist - Dependent (11/20/18 1114)  Lower Body Dressing Deficit: Thread RLE into underwear; Thread LLE into underwear;Pull up over hips (11/20/18 1114)  Toileting Assistance:  Total Assist - Dependent (11/20/18 1114)  Toileting Deficit: Perineal hygiene;Clothing management down;Clothing management up;Increased time to complete;Use of adaptive equipment (11/20/18 1114)  Toileting Equipment Used: Bedside commode (11/20/18 1114)      Household Mobility:    Household Mobility  Supine to Sit: Moderate Assist (Mod) (11/20/18 1114)  Sit to Stand: Moderate Assist (Mod) (11/20/18 1114)  Stand to Sit: Moderate Assist (Mod) (11/20/18 1114)  Stand Pivot Transfers: Total Assist - Dependent(mod x1, min x1) (11/20/18 1114)  Toilet Transfers: Total Assist - Dependent (11/20/18 1114)  Transfer Equipment: 2ww, gait belt (11/20/18 1114)  Sitting - Static: Modified Independent (11/20/18 1114)  Sitting - Dynamic: Supervision (11/20/18 1114)  Standing - Static: Minimal Assist (Min) (11/20/18 1114)  Household Mobility Comments #1: mod x1/min x1 overall due to weakness/confusion, posterior lean, verbaL cues for hand placement on walker, sequencing and initiating tasks (11/20/18 1114)    Home Management:         Cognition:  Cognition Comments: attempted to complete CAM ICU however pt's thinking disorganized and unable to comprehend commands to complete formal assessment (11/20/18 1114)    Communication/Cognition  Communication: Delayed responses (11/20/18 1114)  Overall Cognitive Status: Impaired (11/20/18 1114)  Arousal/Alertness: Appropriate responses to stimuli (11/20/18 1114)  Attention: Impaired sustained attention (11/20/18 1114)  Executive Functions: Impaired initiation (11/20/18 1114)  Memory: Decreased short term memory;Decreased recall of recent events (11/20/18 1114)  Following Verbal Directions: Follows one step directions with repetition; Follows one step directions with increased time (11/20/18 1114)  Safety Judgement: Decreased awareness of need for safety (11/20/18 1114)  Awareness of Deficits: Not aware of deficits (11/20/18 1114)  Cognition Comments: attempted to complete CAM ICU however pt's thinking disorganized and unable to comprehend commands to complete formal assessment (11/20/18 1114)    Patient's Personal Goal: does not state (11/20/18 1114)     EQUIPMENT   Equipment:           GOALS:   Short Term Goals to Be Reviewed On: 11/27/18 (11/20/18 1114)  Short Term Goals Are The Same as Discharge Goals: Yes (11/20/18 1114)  Goal Agreement: Patient agrees with goals and treatment plan (11/20/18 1114)  Feeding Discharge Goal 1: min assist (11/20/18 1114)  Grooming Discharge Goal 1: min assist with verbal cues 2 procedural grooming tasks (11/20/18 1114)  Home Setting Transfer Short Term Goal 1: supervision with 2ww (11/20/18 1114)       EVALUATION CODE JUSTIFICATION:   Eval Code:  $ OT Eval: Moderate Complexity: 1 Procedure  Problems/Co-morbidities:   Patient Active Problem List   Diagnosis   â¢  osteoporosis:  last bone densitometry result requested ;max    â¢ lower tract atrophy   â¢ Stool guaiac positive: ( trace during exam : gastroenterologist follow-up recommended) ;max   â¢ Vitamin A deficiency: 4/5/2012 : vit D = 26 : rx ;max    â¢ Hypothyroidism   â¢ Essential tremor   â¢ Prolapse of anterior vaginal wall   â¢ Vaginal atrophy   â¢ Urge urinary incontinence   â¢ Asymptomatic bacteriuria   â¢ Urinary retention   â¢ Late onset Alzheimer's disease without behavioral disturbance   â¢ Other pulmonary embolism without acute cor pulmonale (CMS/HCC)   â¢ Multiple falls   â¢ Dysphagia   â¢ Cataract   â¢ Flaccid neurogenic bladder   â¢ Gallbladder problem   â¢ Hearing loss   â¢ Macular degeneration   â¢ Ptosis of left eyelid   â¢ PVD (posterior vitreous detachment), right eye   â¢ Tear film insufficiency   â¢ Vitreous floaters of left eye     Personal Occupations Profile Affected: toileting/toilet hygiene, lower body dressing, feeding, functional mobility/transfers  Task Modification: Minimal to moderate task modification  Clinical decision making: Moderate - Patient has several limitations (3-5), comorbidities and/or complexities, as noted in detailed assessment above, that impact their occupational profile.   Resulting in several treatment options and minimal to moderate task modification consistent with moderate clinical decision making complexity. [id number] Detailed

## 2020-10-05 NOTE — ED ADULT NURSE NOTE - PMH
Cancer of prostate    COPD (chronic obstructive pulmonary disease)    History of seizure  LAST SEIZURE 3.5 YRS  [place] (hard of hearing)    Hypercholesterolemia

## 2020-10-05 NOTE — ED PROVIDER NOTE - PMH
Cancer of prostate    COPD (chronic obstructive pulmonary disease)    History of seizure  LAST SEIZURE 3.5 YRS  [place] (hard of hearing)    Hypercholesterolemia

## 2020-10-05 NOTE — ED PROVIDER NOTE - ATTENDING CONTRIBUTION TO CARE
80 year old male, pmhx as documented, presenting for admission to urology for revision of penile implant. Patient states the implant was malfunctioning so he was sent in for admission by Dr. Renner. Patient otherwise denies active complaints or pain. No fevers.    Vital Signs: I have reviewed the initial vital signs.  Constitutional: NAD, well-nourished, appears stated age, no acute distress.  HEENT: Airway patent, moist MM, no erythema/swelling/deformity of oral structures. EOMI, PERRLA.  CV: regular rate, regular rhythm, well-perfused extremities, 2+ b/l DP and radial pulses equal.  Lungs: BCTA, no increased WOB.  ABD: NTND, no guarding or rebound, no pulsatile mass, no hernias.   MSK: Neck supple, nontender, nl ROM, no stepoff. Chest nontender. Back nontender in TLS spine or to b/l bony structures or flanks. Ext nontender, nl rom, no deformity.   INTEG: Skin warm, dry, no rash.  NEURO: A&Ox3, normal strength, nl sensation throughout, normal speech.   PSYCH: Calm, cooperative, normal affect and interaction.    Will obtain pre-op labs, consult urology, re-eval.

## 2020-10-05 NOTE — ED PROVIDER NOTE - NS ED ROS FT
Review of Systems:  	•	CONSTITUTIONAL - no fever, no diaphoresis, no chills  	•	SKIN - no rash  	•	HEMATOLOGIC - no bleeding, no bruising  	•	EYES - no eye pain, no blurry vision  	•	ENT - no change in hearing, no sore throat, no ear pain or tinnitus  	•	RESPIRATORY - no shortness of breath, no cough  	•	CARDIAC - no chest pain, no palpitations  	•	GI - no abd pain, no nausea, no vomiting, no diarrhea, no constipation  	•	GENITO-URINARY - penile pain no discharge, no dysuria; no hematuria, no increased urinary frequency  	•	MUSCULOSKELETAL - no joint paint, no swelling, no redness  	•	NEUROLOGIC - no weakness, no headache, no paresthesias, no LOC  	•	PSYCH - no anxiety, non suicidal, non homicidal, no hallucination, no depression

## 2020-10-05 NOTE — H&P ADULT - HISTORY OF PRESENT ILLNESS
80 year old male with a history of prostate cancer diagnosed in 2002 and had a prostatectomy. He had an IPP in 2003 and had it replaced again approx 10 years later. Known to Dr. Renner for UTI and pending penile implant erosion on left side - originally booked for removal & replacement later this week but with progressive penile pressure and inflammation, prompting him top cvome to ER as per Dr. Renner.  Now scheduled for OR tomorrow. Voiding with a good flow. Denies gross hematuria, dysuria, flank pain, fever, or chills   80 year old male with a history of prostate cancer diagnosed in 2002 and had a prostatectomy. He had an IPP in 2003 and had it replaced again approx 10 years later. Known to Dr. Renner for UTI and pending penile implant erosion on left side - originally booked for removal & replacement later this week but with progressive penile pressure and inflammation, prompting him top cvome to ER as per Dr. Renner.  Now scheduled for OR tomorrow. Voiding with a good flow. Denies gross hematuria, dysuria, flank pain, fever, or chills +frequency    Took ASA 81 mg this AM;

## 2020-10-05 NOTE — ED PROVIDER NOTE - CARE PLAN
Principal Discharge DX:	History of penile implant  Secondary Diagnosis:	Penile pain   Principal Discharge DX:	History of penile implant

## 2020-10-05 NOTE — ASSESSMENT
[FreeTextEntry1] : 80 year old male with a history of prostate cancer diagnosed in 2002 and had a prostatectomy. Patient said his PSA has been undetectable but no recent PSA.He had an IPP in 2003 and had it replaced again approx 10 years later. He states that he noticed 2 months ago that he had a UTI and had microscopic hematuria. He states since he has had penile pressure and inflammation- on my exam has impending erosion on the left side. Also the pump was hard and difficult to work, and has noted a deformity at the right proximal corpora that might be a bend at the midshaft of the coloplast implant. \par  Voiding with a good flow. Denies gross hematuria, dysuria, flank pain, fever, or chills. Non smoker. \par \par pt called me this morning stating that had increased pain and redness at the distal urethra -- on my exam it is apparent that the tip of the implant is nearly eroded \par

## 2020-10-05 NOTE — ED PROVIDER NOTE - CHPI ED SYMPTOMS NEG
no dysuria/no hematuria/no blood in stool/no diarrhea/no abdominal distension/no burning urination/no chills

## 2020-10-06 ENCOUNTER — RESULT REVIEW (OUTPATIENT)
Age: 80
End: 2020-10-06

## 2020-10-06 ENCOUNTER — APPOINTMENT (OUTPATIENT)
Dept: UROLOGY | Facility: HOSPITAL | Age: 80
End: 2020-10-06

## 2020-10-06 ENCOUNTER — TRANSCRIPTION ENCOUNTER (OUTPATIENT)
Age: 80
End: 2020-10-06

## 2020-10-06 VITALS — DIASTOLIC BLOOD PRESSURE: 64 MMHG | HEART RATE: 73 BPM | SYSTOLIC BLOOD PRESSURE: 127 MMHG | TEMPERATURE: 96 F

## 2020-10-06 LAB
ANION GAP SERPL CALC-SCNC: 11 MMOL/L — SIGNIFICANT CHANGE UP (ref 7–14)
BUN SERPL-MCNC: 13 MG/DL — SIGNIFICANT CHANGE UP (ref 10–20)
CALCIUM SERPL-MCNC: 9.3 MG/DL — SIGNIFICANT CHANGE UP (ref 8.5–10.1)
CHLORIDE SERPL-SCNC: 104 MMOL/L — SIGNIFICANT CHANGE UP (ref 98–110)
CO2 SERPL-SCNC: 24 MMOL/L — SIGNIFICANT CHANGE UP (ref 17–32)
CREAT SERPL-MCNC: 1 MG/DL — SIGNIFICANT CHANGE UP (ref 0.7–1.5)
GLUCOSE SERPL-MCNC: 94 MG/DL — SIGNIFICANT CHANGE UP (ref 70–99)
HCT VFR BLD CALC: 44.6 % — SIGNIFICANT CHANGE UP (ref 42–52)
HGB BLD-MCNC: 15.3 G/DL — SIGNIFICANT CHANGE UP (ref 14–18)
MCHC RBC-ENTMCNC: 31.6 PG — HIGH (ref 27–31)
MCHC RBC-ENTMCNC: 34.3 G/DL — SIGNIFICANT CHANGE UP (ref 32–37)
MCV RBC AUTO: 92.1 FL — SIGNIFICANT CHANGE UP (ref 80–94)
NRBC # BLD: 0 /100 WBCS — SIGNIFICANT CHANGE UP (ref 0–0)
PLATELET # BLD AUTO: 201 K/UL — SIGNIFICANT CHANGE UP (ref 130–400)
POTASSIUM SERPL-MCNC: 4.3 MMOL/L — SIGNIFICANT CHANGE UP (ref 3.5–5)
POTASSIUM SERPL-SCNC: 4.3 MMOL/L — SIGNIFICANT CHANGE UP (ref 3.5–5)
RBC # BLD: 4.84 M/UL — SIGNIFICANT CHANGE UP (ref 4.7–6.1)
RBC # FLD: 12.3 % — SIGNIFICANT CHANGE UP (ref 11.5–14.5)
SARS-COV-2 IGG SERPL QL IA: NEGATIVE — SIGNIFICANT CHANGE UP
SARS-COV-2 IGM SERPL IA-ACNC: <0.1 INDEX — SIGNIFICANT CHANGE UP
SODIUM SERPL-SCNC: 139 MMOL/L — SIGNIFICANT CHANGE UP (ref 135–146)
WBC # BLD: 6.1 K/UL — SIGNIFICANT CHANGE UP (ref 4.8–10.8)
WBC # FLD AUTO: 6.1 K/UL — SIGNIFICANT CHANGE UP (ref 4.8–10.8)

## 2020-10-06 PROCEDURE — 54410 REMOVE/REPLACE PENIS PROSTH: CPT

## 2020-10-06 PROCEDURE — 14040 TIS TRNFR F/C/C/M/N/A/G/H/F: CPT

## 2020-10-06 PROCEDURE — 88305 TISSUE EXAM BY PATHOLOGIST: CPT | Mod: 26

## 2020-10-06 RX ORDER — GABAPENTIN 400 MG/1
1 CAPSULE ORAL
Qty: 42 | Refills: 0
Start: 2020-10-06 | End: 2020-10-19

## 2020-10-06 RX ORDER — ONDANSETRON 8 MG/1
4 TABLET, FILM COATED ORAL ONCE
Refills: 0 | Status: DISCONTINUED | OUTPATIENT
Start: 2020-10-06 | End: 2020-10-06

## 2020-10-06 RX ORDER — SODIUM CHLORIDE 9 MG/ML
1000 INJECTION, SOLUTION INTRAVENOUS
Refills: 0 | Status: DISCONTINUED | OUTPATIENT
Start: 2020-10-06 | End: 2020-10-06

## 2020-10-06 RX ORDER — VANCOMYCIN HCL 1 G
1000 VIAL (EA) INTRAVENOUS ONCE
Refills: 0 | Status: COMPLETED | OUTPATIENT
Start: 2020-10-06 | End: 2020-10-06

## 2020-10-06 RX ORDER — LEVETIRACETAM 250 MG/1
750 TABLET, FILM COATED ORAL
Refills: 0 | Status: DISCONTINUED | OUTPATIENT
Start: 2020-10-06 | End: 2020-10-06

## 2020-10-06 RX ORDER — DOCUSATE SODIUM 100 MG
1 CAPSULE ORAL
Qty: 42 | Refills: 0
Start: 2020-10-06 | End: 2020-10-19

## 2020-10-06 RX ORDER — OXYCODONE HYDROCHLORIDE 5 MG/1
1 TABLET ORAL
Qty: 12 | Refills: 0
Start: 2020-10-06 | End: 2020-10-08

## 2020-10-06 RX ORDER — HYDROMORPHONE HYDROCHLORIDE 2 MG/ML
0.5 INJECTION INTRAMUSCULAR; INTRAVENOUS; SUBCUTANEOUS
Refills: 0 | Status: DISCONTINUED | OUTPATIENT
Start: 2020-10-06 | End: 2020-10-06

## 2020-10-06 RX ORDER — ACETAMINOPHEN 500 MG
3 TABLET ORAL
Qty: 168 | Refills: 0
Start: 2020-10-06 | End: 2020-10-19

## 2020-10-06 RX ORDER — CELECOXIB 200 MG/1
200 CAPSULE ORAL ONCE
Refills: 0 | Status: COMPLETED | OUTPATIENT
Start: 2020-10-06 | End: 2020-10-06

## 2020-10-06 RX ORDER — AZTREONAM 2 G
1 VIAL (EA) INJECTION
Qty: 14 | Refills: 0
Start: 2020-10-06 | End: 2020-10-12

## 2020-10-06 RX ORDER — CELECOXIB 200 MG/1
1 CAPSULE ORAL
Qty: 14 | Refills: 0
Start: 2020-10-06 | End: 2020-10-19

## 2020-10-06 RX ORDER — ACETAMINOPHEN 500 MG
975 TABLET ORAL ONCE
Refills: 0 | Status: COMPLETED | OUTPATIENT
Start: 2020-10-06 | End: 2020-10-06

## 2020-10-06 RX ADMIN — CELECOXIB 200 MILLIGRAM(S): 200 CAPSULE ORAL at 16:54

## 2020-10-06 RX ADMIN — Medication 25 MILLIGRAM(S): at 00:40

## 2020-10-06 RX ADMIN — SODIUM CHLORIDE 75 MILLILITER(S): 9 INJECTION, SOLUTION INTRAVENOUS at 21:00

## 2020-10-06 RX ADMIN — Medication 250 MILLIGRAM(S): at 16:53

## 2020-10-06 RX ADMIN — Medication 975 MILLIGRAM(S): at 16:54

## 2020-10-06 RX ADMIN — LEVETIRACETAM 1500 MILLIGRAM(S): 250 TABLET, FILM COATED ORAL at 05:56

## 2020-10-06 RX ADMIN — SODIUM CHLORIDE 100 MILLILITER(S): 9 INJECTION INTRAMUSCULAR; INTRAVENOUS; SUBCUTANEOUS at 04:27

## 2020-10-06 RX ADMIN — Medication 650 MILLIGRAM(S): at 00:39

## 2020-10-06 RX ADMIN — Medication 650 MILLIGRAM(S): at 01:08

## 2020-10-06 RX ADMIN — CELECOXIB 200 MILLIGRAM(S): 200 CAPSULE ORAL at 17:02

## 2020-10-06 RX ADMIN — CEFTRIAXONE 100 MILLIGRAM(S): 500 INJECTION, POWDER, FOR SOLUTION INTRAMUSCULAR; INTRAVENOUS at 05:56

## 2020-10-06 RX ADMIN — Medication 975 MILLIGRAM(S): at 17:02

## 2020-10-06 NOTE — PROGRESS NOTE ADULT - SUBJECTIVE AND OBJECTIVE BOX
CLAIR TAYLOR  80yM    Patient will be going to the OR today for removal and reimplantation of penile implant    Patient seen & examined. Pt has been NPO since midnight. No acute overnight events.  Patient reports pain well controlled with pain medications.        I&O's Detail        Vital Signs Last 24 Hrs  T(C): 36.1 (06 Oct 2020 04:55), Max: 36.3 (05 Oct 2020 12:08)  T(F): 96.9 (06 Oct 2020 04:55), Max: 97.4 (05 Oct 2020 12:08)  HR: 65 (06 Oct 2020 04:55) (60 - 105)  BP: 121/81 (06 Oct 2020 04:55) (121/81 - 162/86)  BP(mean): --  RR: 16 (06 Oct 2020 04:55) (16 - 20)  SpO2: 97% (05 Oct 2020 12:08) (97% - 97%)        Physical exam  General: Wd, Wn, conversant in NAD.  Lungs:  Clear to auscultation, No wheezes, rale or rhonchi.  Cor:  S1 & S2, No murmurs, rubs or gallops.  Abdomen:  + BS, soft, no distention, non-tender, No rebound, guarding or peritoneal signs.  : right sided buckling noted at base of penis, prosthesis is palpable at glans B/L, tubing visible within scrotum/along shaft; impending erosion left distal tip of IPP, pump is difficult to pump. No erythema, edema/tenderness noted   Extremity:  No clubbing, cyanosis or edema.  No calf tenderness.    Neuro:  No focal deficits.           LABS:                        15.3   6.10  )-----------( 201      ( 06 Oct 2020 07:49 )             44.6     10-06    139  |  104  |  13  ----------------------------<  94  4.3   |  24  |  1.0    Ca    9.3      06 Oct 2020 07:49    TPro  7.0  /  Alb  4.3  /  TBili  0.5  /  DBili  x   /  AST  15  /  ALT  14  /  AlkPhos  88  10-05

## 2020-10-06 NOTE — PROGRESS NOTE ADULT - ASSESSMENT
Patient is 80y M, admitted for penile pain    Plan  -pain control  -continue home medication  -Diet- NPO  -Pt is schedule for OR today with Dr. Renner    Case to be d/w Dr. Renner

## 2020-10-06 NOTE — DISCHARGE NOTE PROVIDER - CARE PROVIDER_API CALL
Meir Renner  UROLOGY  [address] [place] [address], [address]  [place] [address]  Phone: [phone]  Fax: [phone]  Follow Up Time:    Meir Renner  UROLOGY  [address] [place] [address], [address]  [place] [address]  Phone: [phone]  Fax: [phone]  Scheduled Appointment: 10/07/2020 03:00 PM

## 2020-10-06 NOTE — DISCHARGE NOTE PROVIDER - NSDCFUADDINST_GEN_ALL_CORE_FT
Please follow up in office tomorrow 10/7/20 at 3 pm Please follow up in office tomorrow 10/7/20 at 3 pm. At that time Dr. Renner will remove the drain and catheter. Keep dressing dry and intact until tomorrow.

## 2020-10-06 NOTE — DISCHARGE NOTE PROVIDER - NSDCMRMEDTOKEN_GEN_ALL_CORE_FT
aspirin 81 mg oral delayed release tablet: 1 tab(s) orally once a day  Keppra 750 mg oral tablet: 2 tab(s) orally every 12 hours  meclizine 25 mg oral tablet:   Multiple Vitamins oral capsule: 1 cap(s) orally once a day  Oxtellar  mg oral tablet, extended release: 1 tab(s) orally once a day  vitamin E 400 intl units oral capsule: 1 cap(s) orally once a day  Zocor 5 mg oral tablet: orally once a day   aspirin 81 mg oral delayed release tablet: 1 tab(s) orally once a day  Bactrim  mg-160 mg oral tablet: 1 tab(s) orally every 12 hours   CeleBREX 200 mg oral capsule: 1 cap(s) orally once a day   Colace 100 mg oral capsule: 1 cap(s) orally every 8 hours   gabapentin 300 mg oral capsule: 1 cap(s) orally every 8 hours   Keppra 750 mg oral tablet: 2 tab(s) orally every 12 hours  meclizine 25 mg oral tablet:   Multiple Vitamins oral capsule: 1 cap(s) orally once a day  Oxtellar  mg oral tablet, extended release: 1 tab(s) orally once a day  oxyCODONE 5 mg oral tablet: 1 tab(s) orally every 6 hours, As Needed -for severe pain MDD:4 tabs  Tylenol 325 mg oral tablet: 3 tab(s) orally every 6 hours   vitamin E 400 intl units oral capsule: 1 cap(s) orally once a day  Zocor 5 mg oral tablet: orally once a day

## 2020-10-06 NOTE — BRIEF OPERATIVE NOTE - NSICDXBRIEFPOSTOP_GEN_ALL_CORE_FT
POST-OP DIAGNOSIS:  Malfunction of penile prosthesis 06-Oct-2020 20:35:43  Meir Renner  Erosion of penile prosthesis 06-Oct-2020 20:35:31  Meir Renner

## 2020-10-06 NOTE — BRIEF OPERATIVE NOTE - NSICDXBRIEFPREOP_GEN_ALL_CORE_FT
PRE-OP DIAGNOSIS:  Malfunction of penile prosthesis 06-Oct-2020 20:35:55  Meir Renner  Erosion of penile prosthesis 06-Oct-2020 20:35:19  Meir Renner

## 2020-10-06 NOTE — DISCHARGE NOTE PROVIDER - PROVIDER TOKENS
PROVIDER:[TOKEN:[[id number]]] PROVIDER:[TOKEN:[[id number]],SCHEDULEDAPPT:[10/07/2020],SCHEDULEDAPPTTIME:[03:00 PM]]

## 2020-10-06 NOTE — PROGRESS NOTE ADULT - ATTENDING COMMENTS
for surgery today to remove and replace penile implant due to impending erosion at the left distal tip as well as difficulty deflating the device as well as deformity at the right mid cylinder causing buckling and sharp pain

## 2020-10-06 NOTE — BRIEF OPERATIVE NOTE - NSICDXBRIEFPROCEDURE_GEN_ALL_CORE_FT
PROCEDURES:  Removal and replacement of multi-component inflatable penile prosthesis 06-Oct-2020 20:35:08  Meir Renner

## 2020-10-06 NOTE — DISCHARGE NOTE NURSING/CASE MANAGEMENT/SOCIAL WORK - PATIENT PORTAL LINK FT
You can access the FollowMyHealth Patient Portal offered by [facility] by registering at the following website: [email or web address]. By joining [facility]’s FollowMyHealth portal, you will also be able to view your health information using other applications (apps) compatible with our system.

## 2020-10-06 NOTE — PROGRESS NOTE ADULT - I WAS PHYSICALLY PRESENT FOR THE KEY PORTIONS OF THE EVALUATION AND MANAGEMENT (E/M) SERVICE PROVIDED.  I AGREE WITH THE ABOVE HISTORY, PHYSICAL, AND PLAN WHICH I HAVE REVIEWED AND EDITED WHERE APPROPRIATE
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 Had an inversion injury to his ankle. Required having his boots cut off. He is reporting some pain and swelling tightness to the posterior lateral aspect of his ankle no prior problems. He's been off work wearing a Cam Walker boot denies shortness of breath or chest pain denies numbness or tingling no recurrent instability     HISTORY OF PRESENT ILLNESS:  This is an initial visit with this 23 year old male seen today for left ankle sprain  The patient complains of  pain for the past 3 week(s)   The pain is described as pressure, deep and heavy.   The pain is localized to the left lateral and posterior ankle.    The pain is exacerbated with dorsiflexion of ankle, weightbearing.   He reports improvement with walking boot and ice rest.   Treatment to date includes as above.    PROBLEM LIST:  There is no problem list on file for this patient.      MEDICATIONS:  Current Outpatient Prescriptions   Medication Sig Dispense Refill   • potassium chloride (K-DUR, KLOR-CON) 10 MEQ CR tablet Take 2 tablets by mouth 2 times daily. 16 tablet 0   • ibuprofen (MOTRIN) 800 MG tablet One tab po TID prn pain 45 tablet 0   • methocarbamol (ROBAXIN) 500 MG tablet 500 mg, half tablet to one tab, every 8 hours when necessary 90 tablet 0   • cyclobenzaprine (FLEXERIL) 10 MG tablet Take 1 tablet by mouth 3 times daily as needed for Muscle spasms. Caution: Sedation. 30 tablet 0     No current facility-administered medications for this visit.        ALLERGIES:  ALLERGIES:   Allergen Reactions   • Morphine MYALGIA       HISTORIES:  I have reviewed the past medical history, family history, social history, medications  and allergies listed in the medical record as obtained by my nursing staff and support staff and agree with their documentation.    PAST SURGICAL HISTORY:   Past Medical History:   Diagnosis Date   • Concussion     2011        Review of systems:    No numbness or tingling    OBJECTIVE:  PHYSICAL EXAM:  Vitals:   Visit Vitals   • Temp 98.4 °F (36.9 °C) (Temporal Artery)   • Ht 6' 1\" (1.854 m)   • Wt 80.3 kg   • BMI 23.35 kg/m2     Constitutional:  Well-developed, well-nourished male in no acute distress.  Musculoskeletal:  Left ankle has some tenderness along the peroneal tendons. His no subluxation no snapping no increased laxity to anterior drawer varus stress. Vascular tendons intact no calf pain negative Homans scans intact appropriate alignment upon standing her knee range of motion      IMAGING STUDIES:  Imaging studies reviewed.  The pertinent positives are normal radiographs    ASSESSMENT:  1. Sprain of left ankle, unspecified ligament, initial encounter        PLAN:  This time I like to get out of the boot having good more of a brace start some physical therapy continue with range of motion proprioception edema control and inflammation, follow-up in a few weeks and then anticipate return to work        Orders Placed This Encounter   • ANKLE BRACE SUPPORT       No Follow-up on file.    Instructions provided as documented in the after visit summary.    The patient indicated understanding of the diagnosis and agreed with the plan of care.       Statement Selected

## 2020-10-06 NOTE — BRIEF OPERATIVE NOTE - OPERATION/FINDINGS
successful removal of penile prosthesis repair of impending erosion of left distal impalnt and replacement with new implant -- 18cm lgx with 1cm extension on the right side only

## 2020-10-06 NOTE — DISCHARGE NOTE PROVIDER - HOSPITAL COURSE
HPI:  80 year old male with a history of prostate cancer diagnosed in 2002 and had a prostatectomy. He had an IPP in 2003 and had it replaced again approx 10 years later. Known to Dr. Renner for UTI and pending penile implant erosion on left side - originally booked for removal & replacement later this week but with progressive penile pressure and inflammation, prompting him top cvome to ER as per Dr. Renner.  Now scheduled for OR tomorrow. Voiding with a good flow. Denies gross hematuria, dysuria, flank pain, fever, or chills +frequency    Took ASA 81 mg this AM;    (05 Oct 2020 13:12)    Pt went to OR on 10/6/20 for removal and replaement of penile implant - he was D/C'd home same day

## 2020-10-07 ENCOUNTER — APPOINTMENT (OUTPATIENT)
Dept: UROLOGY | Facility: CLINIC | Age: 80
End: 2020-10-07
Payer: MEDICARE

## 2020-10-07 VITALS — TEMPERATURE: 97.6 F | WEIGHT: 162 LBS | BODY MASS INDEX: 23.19 KG/M2 | HEIGHT: 70 IN

## 2020-10-07 PROCEDURE — 99024 POSTOP FOLLOW-UP VISIT: CPT

## 2020-10-12 LAB — SURGICAL PATHOLOGY STUDY: SIGNIFICANT CHANGE UP

## 2020-10-12 NOTE — HISTORY OF PRESENT ILLNESS
[FreeTextEntry1] : pt presents for removal of clark catheter and ANDREW drain\par follow up in one week to deflate penile implant

## 2020-10-14 ENCOUNTER — APPOINTMENT (OUTPATIENT)
Dept: UROLOGY | Facility: CLINIC | Age: 80
End: 2020-10-14
Payer: MEDICARE

## 2020-10-14 VITALS — HEIGHT: 70 IN | BODY MASS INDEX: 23.62 KG/M2 | WEIGHT: 165 LBS | TEMPERATURE: 97.5 F

## 2020-10-14 DIAGNOSIS — T83.89XA OTHER SPECIFIED COMPLICATION OF GENITOURINARY PROSTHETIC DEVICES, IMPLANTS AND GRAFTS, INITIAL ENCOUNTER: ICD-10-CM

## 2020-10-14 PROCEDURE — 99024 POSTOP FOLLOW-UP VISIT: CPT

## 2020-10-14 NOTE — HISTORY OF PRESENT ILLNESS
[FreeTextEntry1] : implant deflated today\par follow up in 2 weeks to learn to inflate and deflate\par no signs of infection\par pain is controlled

## 2020-10-19 DIAGNOSIS — T83.410A BREAKDOWN (MECHANICAL) OF IMPLANTED PENILE PROSTHESIS, INITIAL ENCOUNTER: ICD-10-CM

## 2020-10-19 DIAGNOSIS — E78.00 PURE HYPERCHOLESTEROLEMIA, UNSPECIFIED: ICD-10-CM
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
## 2020-10-19 DIAGNOSIS — T83.61XA INFECTION AND INFLAMMATORY REACTION DUE TO IMPLANTED PENILE PROSTHESIS, INITIAL ENCOUNTER: ICD-10-CM

## 2020-10-19 DIAGNOSIS — J44.9 CHRONIC OBSTRUCTIVE PULMONARY DISEASE, UNSPECIFIED: ICD-10-CM

## 2020-10-19 DIAGNOSIS — Z85.46 PERSONAL HISTORY OF MALIGNANT NEOPLASM OF PROSTATE: ICD-10-CM

## 2020-10-19 DIAGNOSIS — L98.9 DISORDER OF THE SKIN AND SUBCUTANEOUS TISSUE, UNSPECIFIED: ICD-10-CM

## 2020-10-19 DIAGNOSIS — T83.84XA PAIN DUE TO GENITOURINARY PROSTHETIC DEVICES, IMPLANTS AND GRAFTS, INITIAL ENCOUNTER: ICD-10-CM

## 2020-10-19 DIAGNOSIS — Z79.82 LONG TERM (CURRENT) USE OF ASPIRIN: ICD-10-CM
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
## 2020-11-04 DIAGNOSIS — R06.02 SHORTNESS OF BREATH: ICD-10-CM

## 2020-11-06 ENCOUNTER — APPOINTMENT (OUTPATIENT)
Dept: UROLOGY | Facility: CLINIC | Age: 80
End: 2020-11-06
Payer: MEDICARE

## 2020-11-06 DIAGNOSIS — Z45.89 ENCOUNTER FOR ADJUSTMENT AND MANAGEMENT OF OTHER IMPLANTED DEVICES: ICD-10-CM

## 2020-11-06 PROCEDURE — 99213 OFFICE O/P EST LOW 20 MIN: CPT | Mod: 24

## 2020-12-21 PROBLEM — Z45.89 ENCOUNTER FOR ADJUSTMENT AND MANAGEMENT OF OTHER IMPLANTED DEVICES: Status: ACTIVE | Noted: 2020-12-21

## 2020-12-21 NOTE — ASSESSMENT
[FreeTextEntry1] : 80 year old male with a history of prostate cancer diagnosed in 2002 and had a prostatectomy. Patient said his PSA has been undetectable but no recent PSA.He had an IPP in 2003 and had it replaced again approx 10 years later.  Had impending erosion and I took to OR for removal and replacement of penile prosthesis.\par \par presents today to learn how to adjust, and inflate and deflate his device\par \par  Voiding with a good flow. Denies gross hematuria, dysuria, flank pain, fever, or chills. Non smoker.

## 2021-02-10 ENCOUNTER — APPOINTMENT (OUTPATIENT)
Dept: UROLOGY | Facility: CLINIC | Age: 81
End: 2021-02-10
Payer: MEDICARE

## 2021-02-10 VITALS — BODY MASS INDEX: 23.62 KG/M2 | HEIGHT: 70 IN | WEIGHT: 165 LBS | TEMPERATURE: 97.2 F

## 2021-02-10 PROCEDURE — 99213 OFFICE O/P EST LOW 20 MIN: CPT

## 2021-02-10 NOTE — HISTORY OF PRESENT ILLNESS
[FreeTextEntry1] : 80 year old male with a history of prostate cancer diagnosed in 2002 and had a prostatectomy. Patient said his PSA has been undetectable but no recent PSA. He had an IPP in 2003 and had it replaced again approx 10 years later. Had impending erosion and I took to OR for removal and replacement of penile prosthesis.\par \par states that is bothered by the placement of the device being so posterior in the scrotum -- has to adjust at nighttime\par \par also has some palpable tubing on the left side base of penis that he find uncomfortable at times\par \par  Voiding with a good flow. Denies gross hematuria, dysuria, flank pain, fever, or chills. Non smoker. \par \par

## 2021-04-14 NOTE — DISCHARGE NOTE PROVIDER - NSDCCPCAREPLAN_GEN_ALL_CORE_FT
Photo Preface (Leave Blank If You Do Not Want): Photographs were obtained today Detail Level: Zone PRINCIPAL DISCHARGE DIAGNOSIS  Diagnosis: History of penile implant  Assessment and Plan of Treatment: 2003      SECONDARY DISCHARGE DIAGNOSES  Diagnosis: Penile pain  Assessment and Plan of Treatment:

## 2021-04-16 NOTE — HISTORY OF PRESENT ILLNESS
[de-identified] : 80 year old male presents for his first Euflexxa injection to his arthritic left knee.  spontaneous

## 2021-05-05 ENCOUNTER — APPOINTMENT (OUTPATIENT)
Dept: ORTHOPEDIC SURGERY | Facility: CLINIC | Age: 81
End: 2021-05-05
Payer: MEDICARE

## 2021-05-05 PROCEDURE — 73564 X-RAY EXAM KNEE 4 OR MORE: CPT | Mod: LT

## 2021-05-05 PROCEDURE — 99213 OFFICE O/P EST LOW 20 MIN: CPT

## 2021-05-05 NOTE — ASSESSMENT
[FreeTextEntry1] : 81 year old male who is well known to us s/p right total knee replacement. He is getting around well and has no pain in regard to his right knee. Pt has progressively worsening pain in his arthritic left knee. Due to having to acre for an ill wife at home he can not entertain surgery as an option. Gel injections have been helpful in the past and he would like to repeat them. FOr immediate pain relief he was given a cortisone injection\par \par \par **Order gel injections for the left knee**\par \par Discussed at length with the patient the planned steroid and lidocaine injection. The risks, benefits, convalescence and alternatives were reviewed. The possible side effects discussed included but were not limited to: pain, swelling, heat and redness. There symptoms are generally mild but if they are extensive then contact the office. Giving pain relievers by mouth such as NSAID’s or Tylenol can generally treat the reactions to  steroid and lidocaine. Rare cases of infection have been noted. Rash, hives and itching may occur post injection. If you have muscle pain or cramps, flushing and or swelling of the face, rapid heart beat, nausea, dizziness, fever, chills, headache, difficulty breathing, swelling in the arms or legs, or have a prickly feeling of your skin, contact a health care provider immediately.\par  \par Following this discussion, the left knee was prepped with betadine and under sterile conditions 4 cc of 1% lidocaine and 5 cc Triamcinalone Acetonide were injected with a  gauge needle. The needle was introduced into the joint, aspiration was performed to ensure intra-articular placement and the medication was injected. Upon withdrawal of the needle the site was cleaned with alcohol and a bandaid applied. The patient tolerated the injection well and there were no adverse effects. Post injection instructions included no strenuous activity for 24 hours, cryotherapy and if there are any adverse effects to contact the office.\par

## 2021-05-05 NOTE — HISTORY OF PRESENT ILLNESS
[de-identified] : 81 year old male s/p right total knee replacement presents to the office today complaining of left knee pain. Pt reports doing well with his right total knee replacement, some pain in the right knee occasionally. In regard to the left knee, patient continues to have pain with activities. He last had a Euflexxa injection with us in June 2020 with good relief. Patient is recently taking care of his sick wife and is looking to have gel injections again to help with his pain.

## 2021-05-05 NOTE — PHYSICAL EXAM
[de-identified] : Left Knee\par Inspection: no effusion or erythema.\par Wounds: none.\par Alignment: normal.\par Palpation: medial joint line tenderness\par ROM: 0-110 degrees, 5 degrees of varus deformity\par Ligamentous laxity: all ligaments appear stable\par Meniscal Test: negative McMurrays.\par Muscle Test: good quad strength.\par Leg examination: calf is soft and non-tender.\par \par Right knee\par Inspection: no effusion or erythema.\par Wounds: Well healed midline incision\par Alignment: normal.\par Palpation: no specific tenderness on palpation.\par ROM 0-110 degrees\par Muscle Test: good quad strength.\par Leg examination: calf is soft and non-tender. [de-identified] : Imaging done today, 3 views of both knees, right knee shows the bony structures to be intact, there are no fractures or dislocations, there is a well aligned cemented PS TKA. Left knee shows narrowed joint space in the medial compartment.

## 2021-06-11 ENCOUNTER — APPOINTMENT (OUTPATIENT)
Dept: ORTHOPEDIC SURGERY | Facility: CLINIC | Age: 81
End: 2021-06-11
Payer: MEDICARE

## 2021-06-11 VITALS — TEMPERATURE: 98 F

## 2021-06-11 PROCEDURE — 20610 DRAIN/INJ JOINT/BURSA W/O US: CPT | Mod: LT

## 2021-06-11 PROCEDURE — 99212 OFFICE O/P EST SF 10 MIN: CPT | Mod: 25

## 2021-06-11 RX ORDER — HYALURONATE SODIUM 20 MG/2 ML
20 SYRINGE (ML) INTRAARTICULAR
Refills: 0 | Status: COMPLETED | OUTPATIENT
Start: 2021-06-11

## 2021-06-11 RX ADMIN — Medication 2 MG/2ML: at 00:00

## 2021-06-11 NOTE — PROCEDURE
[de-identified] : Discussed at length with the patient the planned Euflexxa injection. The risks, benefits, convalescence and alternatives were reviewed. The possible side effects discussed included but were not limited to: pain, swelling, heat and redness. There symptoms are generally mild but if they are extensive then contact the office. Giving pain relievers by mouth such as NSAID’s or Tylenol can generally treat the reactions to steroid and lidocaine. Rare cases of infection have been noted. Rash, hives and itching may occur post injection. If you have muscle pain or cramps, flushing and or swelling of the face, rapid heart beat, nausea, dizziness, fever, chills, headache, difficulty breathing, swelling in the arms or legs, or have a prickly feeling of your skin, contact a health care provider immediately.\par \par Following this discussion, the left knee was prepped with betadine and under sterile conditions the Euflexxa injection was performed with a 22 gauge needle. The needle was introduced into the joint, aspiration was performed to ensure intra-articular placement and the medication was injected. Upon withdrawal of the needle the site was cleaned with alcohol and a bandaid applied. The patient tolerated the injection well and there were no adverse effects. Post injection instructions included no strenuous activity for 24 hours, cryotherapy and if there are any adverse effects to contact the office.

## 2021-06-11 NOTE — HISTORY OF PRESENT ILLNESS
[de-identified] : 81 year old male presents to the office for a Euflexxa injection to the left painful and arthritic knee.

## 2021-06-18 ENCOUNTER — APPOINTMENT (OUTPATIENT)
Dept: ORTHOPEDIC SURGERY | Facility: CLINIC | Age: 81
End: 2021-06-18
Payer: MEDICARE

## 2021-06-18 VITALS — TEMPERATURE: 98 F

## 2021-06-18 PROCEDURE — 20610 DRAIN/INJ JOINT/BURSA W/O US: CPT | Mod: LT

## 2021-06-18 PROCEDURE — 99212 OFFICE O/P EST SF 10 MIN: CPT | Mod: 25

## 2021-06-18 RX ORDER — HYALURONATE SODIUM 20 MG/2 ML
20 SYRINGE (ML) INTRAARTICULAR
Refills: 0 | Status: COMPLETED | OUTPATIENT
Start: 2021-06-18

## 2021-06-18 RX ADMIN — Medication 2 MG/2ML: at 00:00

## 2021-06-18 NOTE — PROCEDURE
[de-identified] : Discussed at length with the patient the planned Euflexxa injection. The risks, benefits, convalescence and alternatives were reviewed. The possible side effects discussed included but were not limited to: pain, swelling, heat and redness. There symptoms are generally mild but if they are extensive then contact the office. Giving pain relievers by mouth such as NSAID’s or Tylenol can generally treat the reactions to steroid and lidocaine. Rare cases of infection have been noted. Rash, hives and itching may occur post injection. If you have muscle pain or cramps, flushing and or swelling of the face, rapid heart beat, nausea, dizziness, fever, chills, headache, difficulty breathing, swelling in the arms or legs, or have a prickly feeling of your skin, contact a health care provider immediately.\par \par Following this discussion, the left knee was prepped with betadine and under sterile conditions the Euflexxa injection was performed with a 22 gauge needle. The needle was introduced into the joint, aspiration was performed to ensure intra-articular placement and the medication was injected. Upon withdrawal of the needle the site was cleaned with alcohol and a bandaid applied. The patient tolerated the injection well and there were no adverse effects. Post injection instructions included no strenuous activity for 24 hours, cryotherapy and if there are any adverse effects to contact the office.

## 2021-06-18 NOTE — HISTORY OF PRESENT ILLNESS
[de-identified] : 81 year old male presents to the office for a Euflexxa injection to the left painful and arthritic knee.

## 2021-06-25 ENCOUNTER — APPOINTMENT (OUTPATIENT)
Dept: ORTHOPEDIC SURGERY | Facility: CLINIC | Age: 81
End: 2021-06-25
Payer: MEDICARE

## 2021-06-25 DIAGNOSIS — M17.12 UNILATERAL PRIMARY OSTEOARTHRITIS, LEFT KNEE: ICD-10-CM

## 2021-06-25 PROCEDURE — 99213 OFFICE O/P EST LOW 20 MIN: CPT | Mod: 25

## 2021-06-25 PROCEDURE — 20610 DRAIN/INJ JOINT/BURSA W/O US: CPT | Mod: LT

## 2021-06-25 RX ORDER — HYALURONATE SODIUM 20 MG/2 ML
20 SYRINGE (ML) INTRAARTICULAR
Refills: 0 | Status: COMPLETED | OUTPATIENT
Start: 2021-06-25

## 2021-06-25 RX ADMIN — Medication 2 MG/2ML: at 00:00

## 2021-06-25 NOTE — HISTORY OF PRESENT ILLNESS
[de-identified] : 81 year old male presents to the office for a Euflexxa injection to the left painful and arthritic knee.

## 2021-06-25 NOTE — PROCEDURE
[de-identified] : Discussed at length with the patient the planned Euflexxa injection. The risks, benefits, convalescence and alternatives were reviewed. The possible side effects discussed included but were not limited to: pain, swelling, heat and redness. There symptoms are generally mild but if they are extensive then contact the office. Giving pain relievers by mouth such as NSAID’s or Tylenol can generally treat the reactions to steroid and lidocaine. Rare cases of infection have been noted. Rash, hives and itching may occur post injection. If you have muscle pain or cramps, flushing and or swelling of the face, rapid heart beat, nausea, dizziness, fever, chills, headache, difficulty breathing, swelling in the arms or legs, or have a prickly feeling of your skin, contact a health care provider immediately.\par \par Following this discussion, the left knee was prepped with betadine and under sterile conditions the Euflexxa injection was performed with a 22 gauge needle. The needle was introduced into the joint, aspiration was performed to ensure intra-articular placement and the medication was injected. Upon withdrawal of the needle the site was cleaned with alcohol and a bandaid applied. The patient tolerated the injection well and there were no adverse effects. Post injection instructions included no strenuous activity for 24 hours, cryotherapy and if there are any adverse effects to contact the office.

## 2021-08-04 NOTE — DISCHARGE NOTE PROVIDER - NSDCFUSCHEDAPPT_GEN_ALL_CORE_FT
CLAIR TAYLOR ; 10/13/2020 ; [facility] PreAdmits  CLAIR TAYLOR ; 10/15/2020 ; [facility] PreAdmits Dapsone Counseling: I discussed with the patient the risks of dapsone including but not limited to hemolytic anemia, agranulocytosis, rashes, methemoglobinemia, kidney failure, peripheral neuropathy, headaches, GI upset, and liver toxicity.  Patients who start dapsone require monitoring including baseline LFTs and weekly CBCs for the first month, then every month thereafter.  The patient verbalized understanding of the proper use and possible adverse effects of dapsone.  All of the patient's questions and concerns were addressed.

## 2021-08-30 ENCOUNTER — APPOINTMENT (OUTPATIENT)
Dept: UROLOGY | Facility: CLINIC | Age: 81
End: 2021-08-30
Payer: MEDICARE

## 2021-08-30 VITALS — WEIGHT: 165 LBS | BODY MASS INDEX: 23.62 KG/M2 | HEIGHT: 70 IN

## 2021-08-30 PROCEDURE — 99213 OFFICE O/P EST LOW 20 MIN: CPT

## 2021-09-16 NOTE — PHYSICAL EXAM
[Well Groomed] : well groomed [General Appearance - In No Acute Distress] : no acute distress [Skin Color & Pigmentation] : normal skin color and pigmentation [] : no respiratory distress [Oriented To Time, Place, And Person] : oriented to person, place, and time [Normal Station and Gait] : the gait and station were normal for the patient's age [FreeTextEntry1] : Penile implant palpable in scrotum. Penis has no swelling or erythema. Implant inflated is firm and adequate for penetration. Both testicles palpated, no pain to palpation.

## 2021-09-16 NOTE — HISTORY OF PRESENT ILLNESS
[FreeTextEntry1] : 81 year old with a history of prostate cancer diagnosed in 2002 and had a prostatectomy. Patient PSA has remained undetectable as per patient. \par Patient had IPP in 2003 and had it replaced again approximately 10 years later. Had impending erosion and I took to OR for removal and replacement of penile prosthesis. \par \par Patient presents to office today stating that he feels that the Implant does not get hard enough and he has difficulty inflating device. Patient demonstrates proper technique and inflates device to adequate firmness. Patient able to deflate device appropriately. \par Of note, patient wife was recently diagnosed with Parkinson's Disease and patient has not been having sex often since. Patient has not been inflating device often. \par \par Patient also states that the scrotum is posterior to his body but he is able to adjust.\par \par Patient denies urinary complaints at this time. \par \par Lab work from 07/10/2021 reviewed:\par Creatinine- 1.03\par GFR- 69\par Urinalysis- Moderate Calcium Oxalate. Negative nitrite, leukocyte, blood. \par PSA total- <0.1 ng/mL

## 2021-09-27 ENCOUNTER — APPOINTMENT (OUTPATIENT)
Dept: ORTHOPEDIC SURGERY | Facility: CLINIC | Age: 81
End: 2021-09-27
Payer: MEDICARE

## 2021-09-27 VITALS — TEMPERATURE: 97.7 F

## 2021-09-27 DIAGNOSIS — Z91.81 HISTORY OF FALLING: ICD-10-CM

## 2021-09-27 DIAGNOSIS — Z96.651 PRESENCE OF RIGHT ARTIFICIAL KNEE JOINT: ICD-10-CM

## 2021-09-27 PROCEDURE — 73562 X-RAY EXAM OF KNEE 3: CPT | Mod: RT

## 2021-09-27 PROCEDURE — 99213 OFFICE O/P EST LOW 20 MIN: CPT

## 2021-09-27 NOTE — ASSESSMENT
[FreeTextEntry1] : 81 year old male well known to us s/p right total knee replacement presents after [place] the airport [place]. He fell onto his right knee. He is able to bear weight and comes in for evaluation. He was told that his knee replacement is fine. He has a quadriceps tendonitis. We will treat this with Voltaren gel and a heating pad.

## 2021-09-27 NOTE — PHYSICAL EXAM
[de-identified] : Right  Knee\par Inspection: no effusion\par Wounds: healed midline incision, no drainage or erythema\par Alignment: normal.\par Palpation: Tenderness over the quadriceps tendon\par ROM: Extensor mechanism is intact, 0-100 with good stability\par Muscle Test: good quad strength.\par Leg examination: calf is soft and non-tender. [de-identified] : Imaging done today 3 views of the right knee shows a well aligned cemented PS TKA, no fracture or dislocation noted

## 2022-08-02 NOTE — PATIENT PROFILE ADULT - NSPROPTRIGHTNOTIFY_GEN_A_NUR
yes Chest Wall Pain    WHAT YOU NEED TO KNOW:    Chest wall pain may be caused by problems with the muscles, cartilage, or bones of the chest wall. Chest wall pain may also be caused by pain that spreads to your chest from another part of your body. The pain may be aching, severe, dull, or sharp. It may come and go, or it may be constant. The pain may be worse when you move in certain ways, breathe deeply, or cough.     DISCHARGE INSTRUCTIONS:    Call 911 if:   •You have any of the following signs of a heart attack: ?Squeezing, pressure, or pain in your chest      ?You may also have any of the following: ?Discomfort or pain in your back, neck, jaw, stomach, or arm      ?Shortness of breath      ?Nausea or vomiting      ?Lightheadedness or a sudden cold sweat            Return to the emergency department if:   •You have severe pain.          Contact your healthcare provider if:   •You develop a rash.       •You have other new symptoms.      •Your pain does not improve, even with treatment.      •You have questions or concerns about your condition or care.       Medicines: You may need any of the following:   •NSAIDs, such as ibuprofen, help decrease swelling, pain, and fever. This medicine is available with or without a doctor's order. NSAIDs can cause stomach bleeding or kidney problems in certain people. If you take blood thinner medicine, always ask your healthcare provider if NSAIDs are safe for you. Always read the medicine label and follow directions.      •Acetaminophen decreases pain. It is available without a doctor's order. Ask how much to take and how often to take it. Follow directions. Acetaminophen can cause liver damage if not taken correctly.      •A cream may be applied to your chest to decrease pain.       •Take your medicine as directed. Contact your healthcare provider if you think your medicine is not helping or if you have side effects. Tell him or her if you are allergic to any medicine. Keep a list of the medicines, vitamins, and herbs you take. Include the amounts, and when and why you take them. Bring the list or the pill bottles to follow-up visits. Carry your medicine list with you in case of an emergency.      Follow up with your healthcare provider as directed: Write down your questions so you remember to ask them during your visits.     Self-care:   •Rest as needed. Avoid activities that make your chest wall pain worse.      •Apply heat on your chest for 20 to 30 minutes every 2 hours for as many days as directed. Heat helps decrease pain and muscle spasms.      •Apply ice on your chest for 15 to 20 minutes every hour or as directed. Use an ice pack, or put crushed ice in a plastic bag. Cover it with a towel. Ice helps prevent tissue damage and decreases swelling and pain.         © Copyright [employer] 2022           back to top                          © Copyright [employer] 2022

## 2022-08-31 ENCOUNTER — APPOINTMENT (OUTPATIENT)
Dept: UROLOGY | Facility: CLINIC | Age: 82
End: 2022-08-31

## 2022-08-31 VITALS
WEIGHT: 165 LBS | TEMPERATURE: 97 F | HEIGHT: 70 IN | SYSTOLIC BLOOD PRESSURE: 121 MMHG | BODY MASS INDEX: 23.62 KG/M2 | RESPIRATION RATE: 14 BRPM | HEART RATE: 65 BPM | DIASTOLIC BLOOD PRESSURE: 82 MMHG

## 2022-08-31 PROCEDURE — 99213 OFFICE O/P EST LOW 20 MIN: CPT

## 2022-08-31 NOTE — HISTORY OF PRESENT ILLNESS
[FreeTextEntry1] : 82 year old with a history of prostate cancer diagnosed in 2002 and had a prostatectomy. Patient PSA has remained undetectable as per patient. \par Patient had IPP in 2003 and had it replaced again approximately 10 years later. Had impending erosion and I took to OR for removal and replacement of penile prosthesis. \par \par states he is able to pleasure his wife but has not been able to have reliable orgasms\par has what seems like a small bulge on the right side of shaft -- goes away if imlant left slightly inflated \par \par Patient also states that the pump is posterior to his body but he is able to adjust.\par \par Patient denies urinary complaints at this time except for a small amount of leakage \par \par Lab work from 07/10/2021 reviewed:\par Creatinine- 1.03\par GFR- 69\par Urinalysis- Moderate Calcium Oxalate. Negative nitrite, leukocyte, blood. \par PSA total- <0.1 ng/mL. \par \par \par

## 2022-09-19 ENCOUNTER — APPOINTMENT (OUTPATIENT)
Dept: NEUROLOGY | Facility: CLINIC | Age: 82
End: 2022-09-19

## 2022-09-19 VITALS
BODY MASS INDEX: 23.62 KG/M2 | HEART RATE: 54 BPM | HEIGHT: 70 IN | WEIGHT: 165 LBS | SYSTOLIC BLOOD PRESSURE: 155 MMHG | DIASTOLIC BLOOD PRESSURE: 93 MMHG

## 2022-09-19 PROCEDURE — 99213 OFFICE O/P EST LOW 20 MIN: CPT

## 2022-09-19 NOTE — ASSESSMENT
[FreeTextEntry1] : Patient's epilepsy is stable and his medications were renewed.  We will follow-up with him in 6 months barring problems.\par \par \par MARLEE Uribe,PA,C \par Eugene Duncan, \par

## 2022-09-19 NOTE — HISTORY OF PRESENT ILLNESS
[FreeTextEntry1] : Mr. Garrett Penny is an 82-year-old male with history of complex partial seizures seen in the office today for an encounter regarding his stable epilepsy. He states he is doing very well and  denies any new or evolving symptoms since his last visit. \par . His last seizure was around 5 years ago. His 1st seizure had been in 2011 although the cause was never\par identified. Since being on Keppra 1500 mg twice daily as well as Oxtellar extended release 300 mg daily he has not had any seizures or seizure-like activity. He sees his primary care practitioner every 3 months and has routine blood work. In addition, he follows with cardiology regularly. He is very compliant with taking his medications. 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 He was a former cigarette smoker but quit in 1980.

## 2022-09-19 NOTE — PHYSICAL EXAM
[FreeTextEntry1] : Patient is an 82-year-old male who appears his stated age.  He was alert and oriented, coherent relevant and appropriate.  He had no difficulty arising and descending from a sitting position independently.  The gait was normal.

## 2023-03-06 ENCOUNTER — APPOINTMENT (OUTPATIENT)
Dept: NEUROLOGY | Facility: CLINIC | Age: 83
End: 2023-03-06

## 2023-03-10 LAB — PSA, POST - PROSTATECTOMY: <0.01 NG/ML

## 2023-03-30 ENCOUNTER — APPOINTMENT (OUTPATIENT)
Dept: NEUROLOGY | Facility: CLINIC | Age: 83
End: 2023-03-30
Payer: MEDICARE

## 2023-03-30 VITALS
WEIGHT: 165 LBS | HEART RATE: 64 BPM | HEIGHT: 70 IN | BODY MASS INDEX: 23.62 KG/M2 | DIASTOLIC BLOOD PRESSURE: 104 MMHG | SYSTOLIC BLOOD PRESSURE: 166 MMHG

## 2023-03-30 PROCEDURE — 99213 OFFICE O/P EST LOW 20 MIN: CPT

## 2023-03-30 RX ORDER — OXCARBAZEPINE 300 MG/1
300 TABLET ORAL
Refills: 0 | Status: ACTIVE | COMMUNITY

## 2023-03-30 NOTE — PHYSICAL EXAM
[General Appearance - Alert] : alert [General Appearance - In No Acute Distress] : in no acute distress [Oriented To Time, Place, And Person] : oriented to person, place, and time [Impaired Insight] : insight and judgment were intact [Affect] : the affect was normal [Person] : oriented to person [Place] : oriented to place [Time] : oriented to time [Concentration Intact] : normal concentrating ability [Visual Intact] : visual attention was ~T not ~L decreased [Naming Objects] : no difficulty naming common objects [Repeating Phrases] : no difficulty repeating a phrase [Writing A Sentence] : no difficulty writing a sentence [Fluency] : fluency intact [Comprehension] : comprehension intact [Reading] : reading intact [Past History] : adequate knowledge of personal past history [Cranial Nerves Optic (II)] : visual acuity intact bilaterally,  visual fields full to confrontation, pupils equal round and reactive to light [Cranial Nerves Oculomotor (III)] : extraocular motion intact [Cranial Nerves Trigeminal (V)] : facial sensation intact symmetrically [Cranial Nerves Facial (VII)] : face symmetrical [Cranial Nerves Vestibulocochlear (VIII)] : hearing was intact bilaterally [Cranial Nerves Glossopharyngeal (IX)] : tongue and palate midline [Cranial Nerves Accessory (XI - Cranial And Spinal)] : head turning and shoulder shrug symmetric [Cranial Nerves Hypoglossal (XII)] : there was no tongue deviation with protrusion [Motor Strength] : muscle strength was normal in all four extremities [No Muscle Atrophy] : normal bulk in all four extremities [Sensation Tactile Decrease] : light touch was intact [Balance] : balance was intact [2+] : Ankle jerk left 2+ [PERRL With Normal Accommodation] : pupils were equal in size, round, reactive to light, with normal accommodation [Extraocular Movements] : extraocular movements were intact [Abnormal Walk] : normal gait [Nail Clubbing] : no clubbing  or cyanosis of the fingernails [Musculoskeletal - Swelling] : no joint swelling seen [Motor Tone] : muscle strength and tone were normal [Past-pointing] : there was no past-pointing [Tremor] : no tremor present [Plantar Reflex Right Only] : normal on the right [Plantar Reflex Left Only] : normal on the left

## 2023-03-30 NOTE — HISTORY OF PRESENT ILLNESS
[FreeTextEntry1] : TODAY: I had the pleasure of seeing Mr. Penny in the office today for a follow up visit. His previous history and physical findings have been reviewed. \par \par Mr. Garrett Penny is an 83 -year-old male with history of complex partial seizures seen in the office today for an encounter regarding his stable epilepsy. He states he is doing very well and  denies any new or evolving symptoms since his last visit.  His last seizure was around 5 years ago. His 1st seizure had been in 2011 although the cause was never identified. Since being on Keppra 1500 mg twice daily as well as Oxtellar extended release 300 mg daily he has not had any seizures or seizure-like activity.  He sees his primary care practitioner every 3 months and has routine blood work. In addition, he follows with cardiology regularly. He is very compliant with taking his medications. \par \par

## 2023-03-30 NOTE — ASSESSMENT
[FreeTextEntry1] : 83 year old male with complex- partial seizure disorder first diagnosed in 2011. Although this is a chronic condition he remains under our care for continued active treatment. Mr. Penny will continue on his current dose of Keppra 750 BID and Oxtellar 300mg XR once daily and will follow up in the office in 6 months. \par

## 2023-06-29 ENCOUNTER — APPOINTMENT (OUTPATIENT)
Dept: NEUROLOGY | Facility: CLINIC | Age: 83
End: 2023-06-29
Payer: MEDICARE

## 2023-06-29 VITALS — BODY MASS INDEX: 23.62 KG/M2 | HEIGHT: 70 IN | WEIGHT: 165 LBS

## 2023-06-29 PROCEDURE — 99213 OFFICE O/P EST LOW 20 MIN: CPT

## 2023-06-29 NOTE — HISTORY OF PRESENT ILLNESS
[FreeTextEntry1] : TODAY: I had the pleasure of seeing Mr. Penny in the office today for a follow up visit. His previous history and physical findings have been reviewed. \par \par He is under our care for complex partial seizures which is a chronic condition he is receiving continuing active treatment for.  He continues to remain seizure free on a regimen of Keppra 1500 mg twice a day as well as oxtellar 300 mg daily.  His last seizure was around 5 years ago and his 1st seizure had been in 2011 although the cause was never identified.  He sees his primary care practitioner every 3 months and has routine blood work. In addition, he follows with cardiology regularly. He is very compliant with taking his medications and we will continue as is without change. \par \par

## 2023-06-29 NOTE — ASSESSMENT
[FreeTextEntry1] : 83 year old male with seizure disorder.  We will continue to prescribe Keppra and Oxtellar as mentioned and f/u in 6 months for re evaluation. If there is any issue he will contact the office.\par \par I personally reviewed with the PA, this patient's history and physical exam findings, as documented above. I have discussed the relevant areas of concern, having direct implications to the presenting problems and illnesses, and I have personally examined all pertinent and positive and negative findings, which impact on the prior neurological treatment. \par \par \par Patrizia Byers, MS, PA-C\par Daron Crowell MD\par

## 2023-08-04 ENCOUNTER — NON-APPOINTMENT (OUTPATIENT)
Age: 83
End: 2023-08-04

## 2023-09-01 ENCOUNTER — APPOINTMENT (OUTPATIENT)
Dept: UROLOGY | Facility: CLINIC | Age: 83
End: 2023-09-01
Payer: MEDICARE

## 2023-09-01 VITALS
DIASTOLIC BLOOD PRESSURE: 71 MMHG | HEIGHT: 70 IN | HEART RATE: 67 BPM | TEMPERATURE: 97.3 F | BODY MASS INDEX: 23.34 KG/M2 | WEIGHT: 163 LBS | SYSTOLIC BLOOD PRESSURE: 133 MMHG

## 2023-09-01 DIAGNOSIS — N48.1 BALANITIS: ICD-10-CM

## 2023-09-01 PROCEDURE — 99214 OFFICE O/P EST MOD 30 MIN: CPT

## 2023-09-01 RX ORDER — CLOTRIMAZOLE 10 MG/G
1 CREAM TOPICAL TWICE DAILY
Qty: 1 | Refills: 1 | Status: ACTIVE | COMMUNITY
Start: 2023-09-01 | End: 1900-01-01

## 2023-09-01 NOTE — HISTORY OF PRESENT ILLNESS
[FreeTextEntry1] : 83 year old male, with a history of prostate cancer diagnosed in 2002 and had a open radical prostatectomy in 2006, patient PSA has remained undetectable as per patient, who we have been following for ED s/p IPP in 2003 which had to be replaced again approximately 10 years later. Had impending erosion needed to be taken to the OR, semi urgently for removal and replacement of penile prosthesis in 2020. Left side was left shorter as he had partial erosion from prior implant.  He reports he has not been having reliable orgasms. has some discomfort at base of penis, where old implant defect was. When deflated right side base has a small lump and discomfort in the area, correlating to the old implant defect at right base.  Reports UTI after his vacation linked to swimming. May have also had balanitis at the same time as he reports getting a cream from his PCP  Reports he had a PSA recently which was undetectable.

## 2023-09-01 NOTE — ASSESSMENT
[FreeTextEntry1] : 83 year old male, with a history of prostate cancer diagnosed in 2002 and had a open radical prostatectomy in 2006, patient PSA has remained undetectable as per patient, who we have been following for ED s/p IPP in 2003 which had to be replaced again approximately 10 years later. Had impending erosion needed to be taken to the OR, semi urgently for removal and replacement of penile prosthesis in 2020. Left side was left shorter as he had partial erosion from prior implant.  He reports he has not been having reliable orgasms. has some discomfort at base of penis, where old implant defect was. When deflated right side base has a small lump and discomfort in the area, correlating to the old implant defect at right base.  Reports UTI after his vacation linked to swimming.  May have also had balanitis at the same time as he reports getting a cream from his PCP  Reports he had a PSA recently which was undetectable.  Plan: Bladder US. UA C&S sent provided and can obtain if symptomatic Clotrimazole if balanitis. Keep semi inflated to avoid right side base discomfort and deflate when urinating. F/U 1month

## 2023-09-01 NOTE — PHYSICAL EXAM
[General Appearance - Well Developed] : well developed [General Appearance - Well Nourished] : well nourished [Normal Appearance] : normal appearance [Well Groomed] : well groomed [Abdomen Soft] : soft [General Appearance - In No Acute Distress] : no acute distress [Abdomen Tenderness] : non-tender [Costovertebral Angle Tenderness] : no ~M costovertebral angle tenderness [Urethral Meatus] : meatus normal [Penis Abnormality] : normal circumcised penis [Urinary Bladder Findings] : the bladder was normal on palpation [Scrotum] : the scrotum was normal [Testes Tenderness] : no tenderness of the testes [Testes Mass (___cm)] : there were no testicular masses [Edema] : no peripheral edema [] : no respiratory distress [Respiration, Rhythm And Depth] : normal respiratory rhythm and effort [Exaggerated Use Of Accessory Muscles For Inspiration] : no accessory muscle use [Oriented To Time, Place, And Person] : oriented to person, place, and time [Affect] : the affect was normal [Mood] : the mood was normal [Not Anxious] : not anxious [Normal Station and Gait] : the gait and station were normal for the patient's age [No Focal Deficits] : no focal deficits [FreeTextEntry1] : Small firm area at right base, correlating to old implant defect.

## 2023-09-09 ENCOUNTER — OUTPATIENT (OUTPATIENT)
Dept: OUTPATIENT SERVICES | Facility: HOSPITAL | Age: 83
LOS: 1 days | End: 2023-09-09
Payer: MEDICARE

## 2023-09-09 DIAGNOSIS — Z98.890 OTHER SPECIFIED POSTPROCEDURAL STATES: Chronic | ICD-10-CM

## 2023-09-09 DIAGNOSIS — N39.0 URINARY TRACT INFECTION, SITE NOT SPECIFIED: ICD-10-CM

## 2023-09-09 DIAGNOSIS — Z96.651 PRESENCE OF RIGHT ARTIFICIAL KNEE JOINT: Chronic | ICD-10-CM

## 2023-09-09 DIAGNOSIS — Z90.79 ACQUIRED ABSENCE OF OTHER GENITAL ORGAN(S): Chronic | ICD-10-CM

## 2023-09-09 DIAGNOSIS — Z90.49 ACQUIRED ABSENCE OF OTHER SPECIFIED PARTS OF DIGESTIVE TRACT: Chronic | ICD-10-CM

## 2023-09-09 DIAGNOSIS — Z96.0 PRESENCE OF UROGENITAL IMPLANTS: Chronic | ICD-10-CM

## 2023-09-09 DIAGNOSIS — Z98.49 CATARACT EXTRACTION STATUS, UNSPECIFIED EYE: Chronic | ICD-10-CM

## 2023-09-09 DIAGNOSIS — S68.119A COMPLETE TRAUMATIC METACARPOPHALANGEAL AMPUTATION OF UNSPECIFIED FINGER, INITIAL ENCOUNTER: Chronic | ICD-10-CM

## 2023-09-09 PROCEDURE — 76857 US EXAM PELVIC LIMITED: CPT | Mod: 26

## 2023-09-09 PROCEDURE — 76857 US EXAM PELVIC LIMITED: CPT

## 2023-09-10 DIAGNOSIS — N39.0 URINARY TRACT INFECTION, SITE NOT SPECIFIED: ICD-10-CM

## 2023-09-11 ENCOUNTER — NON-APPOINTMENT (OUTPATIENT)
Age: 83
End: 2023-09-11

## 2023-11-10 ENCOUNTER — APPOINTMENT (OUTPATIENT)
Dept: UROLOGY | Facility: CLINIC | Age: 83
End: 2023-11-10
Payer: MEDICARE

## 2023-11-10 VITALS
DIASTOLIC BLOOD PRESSURE: 80 MMHG | HEIGHT: 70 IN | HEART RATE: 65 BPM | WEIGHT: 155 LBS | SYSTOLIC BLOOD PRESSURE: 125 MMHG | BODY MASS INDEX: 22.19 KG/M2 | TEMPERATURE: 97.6 F

## 2023-11-10 DIAGNOSIS — R30.0 DYSURIA: ICD-10-CM

## 2023-11-10 DIAGNOSIS — T83.490D OTHER MECHANICAL COMPLICATION OF IMPLANTED PENILE PROSTHESIS, SUBSEQUENT ENCOUNTER: ICD-10-CM

## 2023-11-10 DIAGNOSIS — N52.31 ERECTILE DYSFUNCTION FOLLOWING RADICAL PROSTATECTOMY: ICD-10-CM

## 2023-11-10 PROCEDURE — 99213 OFFICE O/P EST LOW 20 MIN: CPT

## 2023-11-30 LAB
BILIRUB UR QL STRIP: NORMAL
COLLECTION METHOD: NORMAL
GLUCOSE UR-MCNC: NORMAL
HCG UR QL: 0.2 EU/DL
HGB UR QL STRIP.AUTO: NORMAL
KETONES UR-MCNC: NORMAL
LEUKOCYTE ESTERASE UR QL STRIP: NORMAL
NITRITE UR QL STRIP: NORMAL
PH UR STRIP: 5.5
PROT UR STRIP-MCNC: NORMAL
SP GR UR STRIP: 1.03

## 2023-12-21 ENCOUNTER — APPOINTMENT (OUTPATIENT)
Dept: NEUROLOGY | Facility: CLINIC | Age: 83
End: 2023-12-21
Payer: MEDICARE

## 2023-12-21 VITALS
HEIGHT: 70 IN | WEIGHT: 155 LBS | DIASTOLIC BLOOD PRESSURE: 84 MMHG | SYSTOLIC BLOOD PRESSURE: 145 MMHG | HEART RATE: 58 BPM | BODY MASS INDEX: 22.19 KG/M2

## 2023-12-21 PROCEDURE — 99213 OFFICE O/P EST LOW 20 MIN: CPT

## 2023-12-21 RX ORDER — OXCARBAZEPINE 300 MG/1
300 TABLET ORAL
Qty: 90 | Refills: 1 | Status: ACTIVE | COMMUNITY
Start: 2022-09-19 | End: 1900-01-01

## 2023-12-21 NOTE — ASSESSMENT
[FreeTextEntry1] : 83 year old male with seizure disorder.  I will continue to prescribe Keppra 1500 mg twice a day as well as oxtellar 300 mg daily and f/u in 6 months for reevaluation.  He will undergo updated blood work prior to that visit.  Patrizia Byers MS, STUART Crowell MD, Supervising Physician

## 2023-12-21 NOTE — HISTORY OF PRESENT ILLNESS
[FreeTextEntry1] : TODAY: I had the pleasure of seeing Mr. Penny in the office today for a follow up visit. His previous history and physical findings have been reviewed.   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  He states nothing has changed over the past several months with respect to his condition.  He continues to remain seizure free on a regimen of Keppra 1500 mg twice a day as well as oxtellar 300 mg daily.  His last seizure was approximately 6 years ago and his 1st seizure had been in 2011 although the cause was never identified.   He did undergo recent blood work which was in therapeutic range.  He experiences no adverse side effects and we will continue as is without change.

## 2023-12-21 NOTE — PHYSICAL EXAM
[General Appearance - Alert] : alert [General Appearance - In No Acute Distress] : in no acute distress [Impaired Insight] : insight and judgment were intact [Oriented To Time, Place, And Person] : oriented to person, place, and time [Affect] : the affect was normal [Person] : oriented to person [Place] : oriented to place [Time] : oriented to time [Concentration Intact] : normal concentrating ability [Visual Intact] : visual attention was ~T not ~L decreased [Naming Objects] : no difficulty naming common objects [Repeating Phrases] : no difficulty repeating a phrase [Writing A Sentence] : no difficulty writing a sentence [Fluency] : fluency intact [Comprehension] : comprehension intact [Reading] : reading intact [Past History] : adequate knowledge of personal past history [Cranial Nerves Optic (II)] : visual acuity intact bilaterally,  visual fields full to confrontation, pupils equal round and reactive to light [Cranial Nerves Oculomotor (III)] : extraocular motion intact [Cranial Nerves Trigeminal (V)] : facial sensation intact symmetrically [Cranial Nerves Facial (VII)] : face symmetrical [Cranial Nerves Vestibulocochlear (VIII)] : hearing was intact bilaterally [Cranial Nerves Glossopharyngeal (IX)] : tongue and palate midline [Cranial Nerves Accessory (XI - Cranial And Spinal)] : head turning and shoulder shrug symmetric [Cranial Nerves Hypoglossal (XII)] : there was no tongue deviation with protrusion [Motor Strength] : muscle strength was normal in all four extremities [No Muscle Atrophy] : normal bulk in all four extremities [Sensation Tactile Decrease] : light touch was intact [Balance] : balance was intact [Past-pointing] : there was no past-pointing [Tremor] : no tremor present [2+] : Ankle jerk left 2+ [Plantar Reflex Right Only] : normal on the right [Plantar Reflex Left Only] : normal on the left [Extraocular Movements] : extraocular movements were intact [PERRL With Normal Accommodation] : pupils were equal in size, round, reactive to light, with normal accommodation [Abnormal Walk] : normal gait [Nail Clubbing] : no clubbing  or cyanosis of the fingernails [Musculoskeletal - Swelling] : no joint swelling seen [Motor Tone] : muscle strength and tone were normal

## 2024-02-14 ENCOUNTER — APPOINTMENT (OUTPATIENT)
Dept: UROLOGY | Facility: CLINIC | Age: 84
End: 2024-02-14
Payer: MEDICARE

## 2024-02-14 LAB
BILIRUB UR QL STRIP: NORMAL
COLLECTION METHOD: NORMAL
GLUCOSE UR-MCNC: NORMAL
HCG UR QL: 0.2 EU/DL
HGB UR QL STRIP.AUTO: NORMAL
KETONES UR-MCNC: NORMAL
LEUKOCYTE ESTERASE UR QL STRIP: NORMAL
NITRITE UR QL STRIP: NORMAL
PH UR STRIP: 6
PROT UR STRIP-MCNC: NORMAL
SP GR UR STRIP: 1.02

## 2024-02-14 PROCEDURE — 99214 OFFICE O/P EST MOD 30 MIN: CPT

## 2024-02-14 PROCEDURE — G2211 COMPLEX E/M VISIT ADD ON: CPT

## 2024-02-14 NOTE — HISTORY OF PRESENT ILLNESS
[FreeTextEntry1] : Garrett is an 83-year-old with history of prostate cancer.  He was diagnosed in 2002 and had an open radical prostatectomy 2006.  Patient's PSA has remained undetectable as per patient.  He is being followed for erectile dysfunction and had IPP in 2003.  This was replaced again approximately 10 years later.  He had impending erosion which needed to be taken to the OR, semiurgently for removal and replacement of penile prosthesis in 2020.  He presents to office with complaints of dysuria, intermittent need to strain to urinate.  Intermittent weak and strong urinary stream.  He also feels that he recently passed a kidney stone.  He denies any gross hematuria.  He is no longer using implant as his spouse passed away recently. He does continue to have discomfort at base of penis where the old implant effect was. When deflated, right side base has a small amount of discomfort in the area.  He has been advised to keep the implant semiinflated to avoid the herniation of the device.  September 2023, Enterococcus positive urine culture treated with Macrobid. Ultrasound of urinary bladder showed PVR 0 mL.  No prostate.

## 2024-02-14 NOTE — PHYSICAL EXAM
[General Appearance - In No Acute Distress] : no acute distress [] : no respiratory distress [Urethral Meatus] : meatus normal [Oriented To Time, Place, And Person] : oriented to person, place, and time [de-identified] : No penile rash.

## 2024-02-14 NOTE — REVIEW OF SYSTEMS
[see HPI] : see HPI [Chills] : no chills [Fever] : no fever [Chest Pain] : no chest pain [Shortness Of Breath] : no shortness of breath [Abdominal Pain] : no abdominal pain [Vomiting] : no vomiting

## 2024-02-14 NOTE — ASSESSMENT
[FreeTextEntry1] : 83-year-old history of prostate cancer.  History of radical prostatectomy.  History of IPP.  Not using IPP anymore given his spouse recently passed away. He does not want to do further surgery for IPP.  His history of urinary tract infections.  Reported history of recently passing a kidney stone. Currently complains of dysuria.  Plan -PSA -Urinalysis and urine culture -CT scan to assess stones. -Follow-up 3 to 4 weeks for cystoscopy to assess any signs of obstruction or scar tissue formation which could explain patient's urinary tract infections and intermittent urinary symptoms.

## 2024-02-15 LAB — PSA, POST - PROSTATECTOMY: <0.01 NG/ML

## 2024-02-23 RX ORDER — NITROFURANTOIN (MONOHYDRATE/MACROCRYSTALS) 25; 75 MG/1; MG/1
100 CAPSULE ORAL
Qty: 14 | Refills: 0 | Status: COMPLETED | COMMUNITY
Start: 2023-09-05 | End: 2024-02-23

## 2024-02-23 RX ORDER — CIPROFLOXACIN HYDROCHLORIDE 500 MG/1
500 TABLET, FILM COATED ORAL
Qty: 10 | Refills: 0 | Status: ACTIVE | COMMUNITY
Start: 2024-02-23 | End: 1900-01-01

## 2024-03-20 ENCOUNTER — APPOINTMENT (OUTPATIENT)
Dept: UROLOGY | Facility: CLINIC | Age: 84
End: 2024-03-20
Payer: MEDICARE

## 2024-03-20 DIAGNOSIS — N20.0 CALCULUS OF KIDNEY: ICD-10-CM

## 2024-03-20 DIAGNOSIS — A49.9 URINARY TRACT INFECTION, SITE NOT SPECIFIED: ICD-10-CM

## 2024-03-20 DIAGNOSIS — R39.9 UNSPECIFIED SYMPTOMS AND SIGNS INVOLVING THE GENITOURINARY SYSTEM: ICD-10-CM

## 2024-03-20 DIAGNOSIS — N39.0 URINARY TRACT INFECTION, SITE NOT SPECIFIED: ICD-10-CM

## 2024-03-20 DIAGNOSIS — C61 MALIGNANT NEOPLASM OF PROSTATE: ICD-10-CM

## 2024-03-20 LAB
BILIRUB UR QL STRIP: NORMAL
GLUCOSE UR-MCNC: NORMAL
HCG UR QL: 0.2 EU/DL
HGB UR QL STRIP.AUTO: NORMAL
KETONES UR-MCNC: NORMAL
LEUKOCYTE ESTERASE UR QL STRIP: NORMAL
NITRITE UR QL STRIP: NORMAL
PH UR STRIP: 5.5
PROT UR STRIP-MCNC: NORMAL
SP GR UR STRIP: 1.03

## 2024-03-20 PROCEDURE — 52000 CYSTOURETHROSCOPY: CPT

## 2024-03-20 PROCEDURE — 99214 OFFICE O/P EST MOD 30 MIN: CPT | Mod: 25

## 2024-03-20 RX ORDER — CIPROFLOXACIN HYDROCHLORIDE 500 MG/1
500 TABLET, FILM COATED ORAL TWICE DAILY
Qty: 10 | Refills: 3 | Status: ACTIVE | COMMUNITY
Start: 2024-03-20 | End: 1900-01-01

## 2024-03-20 NOTE — PLAN
[TextEntry] : cipro with refills ordered CT and cysto found no stones or masses or reason for recurrent UTIs constipation may be causing some incomplete emptying leading to UTIs -- he will work on fiber, stool softener, miralax, hydration PSA in early 2025  can follow up in 3 months with Dr Hurt

## 2024-03-20 NOTE — LETTER BODY
[Dear  ___] : Dear  [unfilled], [Consult Letter:] : I had the pleasure of evaluating your patient, [unfilled]. [Please see my note below.] : Please see my note below. [Consult Closing:] : Thank you very much for allowing me to participate in the care of this patient.  If you have any questions, please do not hesitate to contact me. [Sincerely,] : Sincerely, [FreeTextEntry3] : Meir Renner MD Director of Male Sexual Dysfunction and Urologic Prosthetics

## 2024-03-20 NOTE — HISTORY OF PRESENT ILLNESS
[FreeTextEntry1] : history of prostate cancer diagnosed in 2002 and had an open radical prostatectomy 2006.   Patient's PSA has remained undetectable as per patient. He is being followed for erectile dysfunction and had IPP in 2003. This was replaced again approximately 10 years later. He had impending erosion which needed to be taken to the OR, semiurgently for removal and replacement of penile prosthesis in 2020.  He presents to office with complaints of dysuria, intermittent need to strain to urinate. Intermittent weak and strong urinary stream. He also feels that he recently passed a kidney stone. He denies any gross hematuria.  Now complaining of constipation for weeks requests cipro to have on hand for future signs of UTI.   He is no longer using implant as his spouse passed away recently. He does continue to have discomfort at base of penis where the old implant effect was. When deflated, right side base has a small amount of discomfort in the area. He has been advised to keep the implant semiinflated to avoid the herniation of the device.  September 2023, Enterococcus positive urine culture treated with Macrobid. Ultrasound of urinary bladder showed PVR 0 mL. No prostate.  March 2024 UA - neg nit, neg LE cysto - no urethral obstruction -s/p prostatectomy. no stones or masses in bladder  Feb 2024 Culture - Urine; enterococcus - tx with cipro CT Abdomen and Pelvis - no stones or hydro, no suspicious osseous lesions.  PSA Post; <0.01

## 2024-04-01 ENCOUNTER — RX RENEWAL (OUTPATIENT)
Age: 84
End: 2024-04-01

## 2024-05-14 ENCOUNTER — EMERGENCY (EMERGENCY)
Facility: HOSPITAL | Age: 84
LOS: 0 days | Discharge: HOME | End: 2024-05-14
Attending: EMERGENCY MEDICINE
Payer: COMMERCIAL

## 2024-05-14 VITALS
HEIGHT: 70 IN | DIASTOLIC BLOOD PRESSURE: 90 MMHG | TEMPERATURE: 98 F | WEIGHT: 160.06 LBS | RESPIRATION RATE: 18 BRPM | SYSTOLIC BLOOD PRESSURE: 153 MMHG | HEART RATE: 111 BPM | OXYGEN SATURATION: 100 %

## 2024-05-14 DIAGNOSIS — Z96.0 PRESENCE OF UROGENITAL IMPLANTS: Chronic | ICD-10-CM

## 2024-05-14 DIAGNOSIS — S68.119A COMPLETE TRAUMATIC METACARPOPHALANGEAL AMPUTATION OF UNSPECIFIED FINGER, INITIAL ENCOUNTER: Chronic | ICD-10-CM

## 2024-05-14 DIAGNOSIS — Z90.49 ACQUIRED ABSENCE OF OTHER SPECIFIED PARTS OF DIGESTIVE TRACT: Chronic | ICD-10-CM
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
## 2024-05-14 DIAGNOSIS — M25.561 PAIN IN RIGHT KNEE: ICD-10-CM

## 2024-05-14 DIAGNOSIS — Z87.891 PERSONAL HISTORY OF NICOTINE DEPENDENCE: ICD-10-CM

## 2024-05-14 DIAGNOSIS — G40.909 EPILEPSY, UNSPECIFIED, NOT INTRACTABLE, WITHOUT STATUS EPILEPTICUS: ICD-10-CM

## 2024-05-14 DIAGNOSIS — Z96.651 PRESENCE OF RIGHT ARTIFICIAL KNEE JOINT: ICD-10-CM

## 2024-05-14 DIAGNOSIS — Z96.651 PRESENCE OF RIGHT ARTIFICIAL KNEE JOINT: Chronic | ICD-10-CM

## 2024-05-14 DIAGNOSIS — Z98.890 OTHER SPECIFIED POSTPROCEDURAL STATES: Chronic | ICD-10-CM
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
## 2024-05-14 DIAGNOSIS — Z90.79 ACQUIRED ABSENCE OF OTHER GENITAL ORGAN(S): Chronic | ICD-10-CM

## 2024-05-14 DIAGNOSIS — Z98.49 CATARACT EXTRACTION STATUS, UNSPECIFIED EYE: Chronic | ICD-10-CM

## 2024-05-14 PROCEDURE — 99284 EMERGENCY DEPT VISIT MOD MDM: CPT | Mod: 25

## 2024-05-14 PROCEDURE — 73562 X-RAY EXAM OF KNEE 3: CPT | Mod: RT

## 2024-05-14 PROCEDURE — 73590 X-RAY EXAM OF LOWER LEG: CPT | Mod: 26,RT

## 2024-05-14 PROCEDURE — 99284 EMERGENCY DEPT VISIT MOD MDM: CPT

## 2024-05-14 PROCEDURE — 73562 X-RAY EXAM OF KNEE 3: CPT | Mod: 26,RT

## 2024-05-14 PROCEDURE — 73590 X-RAY EXAM OF LOWER LEG: CPT | Mod: RT

## 2024-05-14 NOTE — ED PROVIDER NOTE - PATIENT PORTAL LINK FT
You can access the FollowMyHealth Patient Portal offered by [facility] by registering at the following website: [email or web address]. By joining [email or web address] FollowMyHealth portal, you will also be able to view your health information using other applications (apps) compatible with our system.

## 2024-05-14 NOTE — ED PROVIDER NOTE - PHYSICAL EXAMINATION
VITAL SIGNS: I have reviewed nursing notes and confirm.  CONSTITUTIONAL: well-appearing, non-toxic, NAD  SKIN: Warm dry, normal skin turgor  HEAD: NCAT  EYES: EOMI, PERRLA, no scleral icterus  ENT: Moist mucous membranes, normal pharynx with no erythema or exudates  NECK: Supple; non tender. Full ROM. No cervical LAD  CARD: RRR, no murmurs, rubs or gallops  RESP: clear to ausculation b/l.  No rales, rhonchi, or wheezing.  ABD: soft, + BS, non-tender, non-distended, no rebound or guarding. No CVA tenderness  EXT: Full ROM, no bony tenderness, no pedal edema, no calf tenderness (+) Tenderness over the lateral aspect of the right knee.  Mild tenderness over the fibular head.  NEURO: normal motor. normal sensory. CN II-XII intact. Normal gait.   PSYCH: Cooperative, appropriate.

## 2024-05-14 NOTE — ED ADULT NURSE NOTE - NSICDXPASTMEDICALHX_GEN_ALL_CORE_FT
PAST MEDICAL HISTORY:  Cancer of prostate     COPD (chronic obstructive pulmonary disease)     History of seizure LAST SEIZURE 3.5 YRS    [place] (hard of hearing)     Hypercholesterolemia
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
## 2024-05-14 NOTE — ED PROVIDER NOTE - WR ORDER STATUS 2
Breast Center ([facility]) [phone] (2nd Floor [facility])   Breast Center ([facility]) [phone] ([address])         [facility] Medication Refill Request Information:  * Please contact your pharmacy regarding ANY request for medication refills.  ** [facility] Prescription Fax = [phone]  * Please allow 3 business days for routine medication refills.  * Please allow 5 business days for controlled substance medication refills.     [facility] Test Result notification information:  *You will be notified with in 7-10 days of your appointment day regarding the results of your test.  If you are on MyChart you will be notified as soon as the provider has reviewed the results and signed off on them.    [facility] Center: [phone]     Schedule mammogram  Give urine sample for diabetes  Make eye appt for diabetes  Check on Shingrix - for shingles shot  Colon test in 2020      Nutritious diet  Eat 1200 mg calcium total every day.  Many people achieve this by a diet containing calcium (milk, yogurt, cheese, and other dairy products, supplemented food) and 1 calcium pill. If you don't eat dairy, you should take a calcium supplement 2 times a day.  Eat 1000 IU of vitamin D on daily basis.    Eat a variety of fruits and vegetables and eat whole grains.  Try to choose foods with a high NuVal score, if your grocery store shows this number. High numbers, like 80 or 90, are nutritious foods, and low scores, like 10 are less nutritious foods.          Men should drink no more than 2 alcoholic beverages daily on average; women should drink no more than 1 alcoholic beverage daily on average.    Everyone should avoid all tobacco and nicotine-containing products.    Exercise: Aim for:  Moderate activity (where you can still talk while doing it, such as walking about 100 steps per minute, or 3,000 steps in 30 minutes) for 30 minutes at least 5 days a week  Or  Vigorous exercise  (you are working so hard you are breathing hard and fast and your heart rate has gone up, such as playing basketball or soccer) at least 75 minutes weekly    If you have trouble doing 30 minutes of activity, all at once, try small bursts of activity. Go to [email or web address] for suggestions on how you can do this at home or work.     All adults should try to do muscle strengthening exercise at least 2 days a week    Safety  Always wear bike/motorcycle helmet and seatbelt in a vehicle  Make sure your home has smoke and carbon monoxide detectors.  Wear broad spectrum sunscreen of at least SPF 15 on sun-exposed skin.    Preventing disease  See your dentist at least once a year  Have your eyes checked every 1-2 years.  Get a flu shot each year.         Performed
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
## 2024-05-14 NOTE — ED PROVIDER NOTE - NSFOLLOWUPINSTRUCTIONS_ED_ALL_ED_FT
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
## 2024-06-07 ENCOUNTER — APPOINTMENT (OUTPATIENT)
Dept: NEUROLOGY | Facility: CLINIC | Age: 84
End: 2024-06-07
Payer: MEDICARE

## 2024-06-07 ENCOUNTER — APPOINTMENT (OUTPATIENT)
Dept: NEUROLOGY | Facility: CLINIC | Age: 84
End: 2024-06-07

## 2024-06-07 VITALS
SYSTOLIC BLOOD PRESSURE: 157 MMHG | DIASTOLIC BLOOD PRESSURE: 82 MMHG | HEART RATE: 65 BPM | BODY MASS INDEX: 22.19 KG/M2 | HEIGHT: 70 IN | WEIGHT: 155 LBS

## 2024-06-07 DIAGNOSIS — G40.009 LOCALIZATION-RELATED (FOCAL) (PARTIAL) IDIOPATHIC EPILEPSY AND EPILEPTIC SYNDROMES WITH SEIZURES OF LOCALIZED ONSET, NOT INTRACTABLE, W/OUT STATUS EPILEPTICUS: ICD-10-CM

## 2024-06-07 PROCEDURE — 99213 OFFICE O/P EST LOW 20 MIN: CPT

## 2024-06-07 RX ORDER — OXCARBAZEPINE 300 MG/1
300 TABLET ORAL
Qty: 30 | Refills: 11 | Status: ACTIVE | COMMUNITY
Start: 2023-03-30 | End: 1900-01-01

## 2024-06-07 RX ORDER — LEVETIRACETAM 750 MG/1
750 TABLET, FILM COATED ORAL
Qty: 360 | Refills: 3 | Status: ACTIVE | COMMUNITY
Start: 2022-09-19 | End: 1900-01-01

## 2024-06-07 RX ORDER — LEVETIRACETAM 750 MG/1
750 TABLET, FILM COATED ORAL
Qty: 180 | Refills: 3 | Status: DISCONTINUED | COMMUNITY
Start: 1900-01-01 | End: 2024-06-07

## 2024-06-07 NOTE — ASSESSMENT
[FreeTextEntry1] : Seizure-controlled - Continue with Keppra 750 mg, Oxtellar  mg - Bone density scan - Followed by the year barring any new issues

## 2024-06-07 NOTE — HISTORY OF PRESENT ILLNESS
[FreeTextEntry1] : Mr. CLAIR TAYLOR returns to the office for follow-up and his prior history and physical have been reviewed and he reports no change since last visit.  he has been seeing us for epilepsy and has been stable on Keppra and Oxtellar and has been seizure-free for 10 years.  He has not had any bone density scan

## 2024-09-17 ENCOUNTER — APPOINTMENT (OUTPATIENT)
Dept: NEUROLOGY | Facility: CLINIC | Age: 84
End: 2024-09-17
Payer: MEDICARE

## 2024-09-17 VITALS
BODY MASS INDEX: 22.19 KG/M2 | HEART RATE: 73 BPM | SYSTOLIC BLOOD PRESSURE: 169 MMHG | DIASTOLIC BLOOD PRESSURE: 102 MMHG | WEIGHT: 155 LBS | HEIGHT: 70 IN

## 2024-09-17 DIAGNOSIS — G40.009 LOCALIZATION-RELATED (FOCAL) (PARTIAL) IDIOPATHIC EPILEPSY AND EPILEPTIC SYNDROMES WITH SEIZURES OF LOCALIZED ONSET, NOT INTRACTABLE, W/OUT STATUS EPILEPTICUS: ICD-10-CM

## 2024-09-17 PROCEDURE — 99214 OFFICE O/P EST MOD 30 MIN: CPT

## 2024-09-17 NOTE — HISTORY OF PRESENT ILLNESS
[FreeTextEntry1] : Mr. CLAIR TAYLOR returns to the office for follow-up and his prior history and physical have been reviewed and he reports no change since last visit.  he has been seeing us for epilepsy and has been seizure-free for over 10 years on both Keppra and Oxtellar.  Recently he had an episode of witnessed seizure which happened in the setting of dehydration and heat.  He complained of feeling lightheaded before he lost consciousness.  There was no generalized tonic-clonic activity, but he was witnessed to have eyes opening, mouth twitching, and afterwards was disoriented for some time.  He came back to his baseline when the EMS arrived, and was not taken to a hospital.
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
## 2024-12-10 ENCOUNTER — APPOINTMENT (OUTPATIENT)
Dept: NEUROLOGY | Facility: CLINIC | Age: 84
End: 2024-12-10
Payer: MEDICARE

## 2024-12-10 VITALS
SYSTOLIC BLOOD PRESSURE: 120 MMHG | HEIGHT: 70 IN | BODY MASS INDEX: 22.19 KG/M2 | DIASTOLIC BLOOD PRESSURE: 81 MMHG | WEIGHT: 155 LBS | HEART RATE: 91 BPM

## 2024-12-10 DIAGNOSIS — G40.009 LOCALIZATION-RELATED (FOCAL) (PARTIAL) IDIOPATHIC EPILEPSY AND EPILEPTIC SYNDROMES WITH SEIZURES OF LOCALIZED ONSET, NOT INTRACTABLE, W/OUT STATUS EPILEPTICUS: ICD-10-CM

## 2024-12-10 DIAGNOSIS — R55 SYNCOPE AND COLLAPSE: ICD-10-CM

## 2024-12-10 PROCEDURE — 99213 OFFICE O/P EST LOW 20 MIN: CPT

## 2025-04-04 ENCOUNTER — NON-APPOINTMENT (OUTPATIENT)
Age: 85
End: 2025-04-04

## 2025-04-04 ENCOUNTER — APPOINTMENT (OUTPATIENT)
Dept: ORTHOPEDIC SURGERY | Facility: CLINIC | Age: 85
End: 2025-04-04
Payer: MEDICARE

## 2025-04-04 VITALS — WEIGHT: 160 LBS | HEIGHT: 70 IN | BODY MASS INDEX: 22.9 KG/M2

## 2025-04-04 DIAGNOSIS — M17.12 UNILATERAL PRIMARY OSTEOARTHRITIS, LEFT KNEE: ICD-10-CM

## 2025-04-04 DIAGNOSIS — Z96.651 PRESENCE OF RIGHT ARTIFICIAL KNEE JOINT: ICD-10-CM

## 2025-04-04 PROCEDURE — 99203 OFFICE O/P NEW LOW 30 MIN: CPT

## 2025-04-04 PROCEDURE — 73564 X-RAY EXAM KNEE 4 OR MORE: CPT | Mod: 50

## 2025-04-04 PROCEDURE — 73522 X-RAY EXAM HIPS BI 3-4 VIEWS: CPT

## 2025-04-10 ENCOUNTER — OUTPATIENT (OUTPATIENT)
Dept: OUTPATIENT SERVICES | Facility: HOSPITAL | Age: 85
LOS: 1 days | End: 2025-04-10
Payer: MEDICARE

## 2025-04-10 ENCOUNTER — RESULT REVIEW (OUTPATIENT)
Age: 85
End: 2025-04-10

## 2025-04-10 DIAGNOSIS — Z96.651 PRESENCE OF RIGHT ARTIFICIAL KNEE JOINT: Chronic | ICD-10-CM

## 2025-04-10 DIAGNOSIS — Z98.890 OTHER SPECIFIED POSTPROCEDURAL STATES: Chronic | ICD-10-CM

## 2025-04-10 DIAGNOSIS — Z96.651 PRESENCE OF RIGHT ARTIFICIAL KNEE JOINT: ICD-10-CM

## 2025-04-10 DIAGNOSIS — Z90.49 ACQUIRED ABSENCE OF OTHER SPECIFIED PARTS OF DIGESTIVE TRACT: Chronic | ICD-10-CM

## 2025-04-10 DIAGNOSIS — S68.119A COMPLETE TRAUMATIC METACARPOPHALANGEAL AMPUTATION OF UNSPECIFIED FINGER, INITIAL ENCOUNTER: Chronic | ICD-10-CM

## 2025-04-10 DIAGNOSIS — Z90.79 ACQUIRED ABSENCE OF OTHER GENITAL ORGAN(S): Chronic | ICD-10-CM

## 2025-04-10 DIAGNOSIS — Z96.0 PRESENCE OF UROGENITAL IMPLANTS: Chronic | ICD-10-CM

## 2025-04-10 PROCEDURE — 73721 MRI JNT OF LWR EXTRE W/O DYE: CPT | Mod: RT

## 2025-04-10 PROCEDURE — 73721 MRI JNT OF LWR EXTRE W/O DYE: CPT | Mod: 26,RT

## 2025-04-11 DIAGNOSIS — Z96.651 PRESENCE OF RIGHT ARTIFICIAL KNEE JOINT: ICD-10-CM

## 2025-04-24 ENCOUNTER — APPOINTMENT (OUTPATIENT)
Dept: ORTHOPEDIC SURGERY | Facility: CLINIC | Age: 85
End: 2025-04-24
Payer: MEDICARE

## 2025-04-24 PROCEDURE — 20610 DRAIN/INJ JOINT/BURSA W/O US: CPT | Mod: LT

## 2025-05-02 ENCOUNTER — APPOINTMENT (OUTPATIENT)
Dept: ORTHOPEDIC SURGERY | Facility: CLINIC | Age: 85
End: 2025-05-02
Payer: MEDICARE

## 2025-05-02 PROCEDURE — 20610 DRAIN/INJ JOINT/BURSA W/O US: CPT | Mod: LT

## 2025-05-09 ENCOUNTER — APPOINTMENT (OUTPATIENT)
Dept: ORTHOPEDIC SURGERY | Facility: CLINIC | Age: 85
End: 2025-05-09
Payer: MEDICARE

## 2025-05-09 VITALS — HEIGHT: 70 IN | BODY MASS INDEX: 22.9 KG/M2 | WEIGHT: 160 LBS

## 2025-05-09 DIAGNOSIS — M17.12 UNILATERAL PRIMARY OSTEOARTHRITIS, LEFT KNEE: ICD-10-CM

## 2025-05-09 DIAGNOSIS — Z96.651 PRESENCE OF RIGHT ARTIFICIAL KNEE JOINT: ICD-10-CM

## 2025-05-09 PROCEDURE — 99213 OFFICE O/P EST LOW 20 MIN: CPT

## 2025-06-11 ENCOUNTER — APPOINTMENT (OUTPATIENT)
Dept: NEUROLOGY | Facility: CLINIC | Age: 85
End: 2025-06-11
Payer: MEDICARE

## 2025-06-11 VITALS
DIASTOLIC BLOOD PRESSURE: 91 MMHG | BODY MASS INDEX: 22.9 KG/M2 | SYSTOLIC BLOOD PRESSURE: 142 MMHG | HEART RATE: 85 BPM | WEIGHT: 160 LBS | HEIGHT: 70 IN

## 2025-06-11 PROCEDURE — 99213 OFFICE O/P EST LOW 20 MIN: CPT

## 2025-08-11 ENCOUNTER — APPOINTMENT (OUTPATIENT)
Dept: ORTHOPEDIC SURGERY | Facility: CLINIC | Age: 85
End: 2025-08-11
Payer: MEDICARE

## 2025-08-11 DIAGNOSIS — Z96.651 PRESENCE OF RIGHT ARTIFICIAL KNEE JOINT: ICD-10-CM

## 2025-08-11 DIAGNOSIS — M17.12 UNILATERAL PRIMARY OSTEOARTHRITIS, LEFT KNEE: ICD-10-CM

## 2025-08-11 PROCEDURE — 73564 X-RAY EXAM KNEE 4 OR MORE: CPT | Mod: 50

## 2025-08-11 PROCEDURE — 99213 OFFICE O/P EST LOW 20 MIN: CPT
